# Patient Record
Sex: FEMALE | Race: WHITE | NOT HISPANIC OR LATINO | Employment: OTHER | URBAN - METROPOLITAN AREA
[De-identification: names, ages, dates, MRNs, and addresses within clinical notes are randomized per-mention and may not be internally consistent; named-entity substitution may affect disease eponyms.]

---

## 2023-03-20 LAB
ERYTHROCYTE DISTRIBUTION WIDTH (RATIO) BY AUTOMATED COUNT: 15.8 % (ref 11.5–14.5)
ERYTHROCYTE MEAN CORPUSCULAR HEMOGLOBIN CONCENTRATION (G/DL) BY AUTOMATED: 30.5 G/DL (ref 32–36)
ERYTHROCYTE MEAN CORPUSCULAR VOLUME (FL) BY AUTOMATED COUNT: 89 FL (ref 80–100)
ERYTHROCYTES (10*6/UL) IN BLOOD BY AUTOMATED COUNT: 4.67 X10E12/L (ref 4–5.2)
HEMATOCRIT (%) IN BLOOD BY AUTOMATED COUNT: 41.7 % (ref 36–46)
HEMOGLOBIN (G/DL) IN BLOOD: 12.7 G/DL (ref 12–16)
LEUKOCYTES (10*3/UL) IN BLOOD BY AUTOMATED COUNT: 11.7 X10E9/L (ref 4.4–11.3)
NRBC (PER 100 WBCS) BY AUTOMATED COUNT: 0 /100 WBC (ref 0–0)
PLATELETS (10*3/UL) IN BLOOD AUTOMATED COUNT: 204 X10E9/L (ref 150–450)

## 2023-03-21 DIAGNOSIS — F41.9 ANXIETY: Primary | ICD-10-CM

## 2023-03-21 LAB
ALANINE AMINOTRANSFERASE (SGPT) (U/L) IN SER/PLAS: 21 U/L (ref 7–45)
ALBUMIN (G/DL) IN SER/PLAS: 4.3 G/DL (ref 3.4–5)
ALKALINE PHOSPHATASE (U/L) IN SER/PLAS: 109 U/L (ref 33–136)
ANION GAP IN SER/PLAS: 14 MMOL/L (ref 10–20)
ASPARTATE AMINOTRANSFERASE (SGOT) (U/L) IN SER/PLAS: 28 U/L (ref 9–39)
BILIRUBIN TOTAL (MG/DL) IN SER/PLAS: 0.5 MG/DL (ref 0–1.2)
CALCIUM (MG/DL) IN SER/PLAS: 10.1 MG/DL (ref 8.6–10.6)
CARBON DIOXIDE, TOTAL (MMOL/L) IN SER/PLAS: 27 MMOL/L (ref 21–32)
CHLORIDE (MMOL/L) IN SER/PLAS: 106 MMOL/L (ref 98–107)
CREATININE (MG/DL) IN SER/PLAS: 1.19 MG/DL (ref 0.5–1.05)
GFR FEMALE: 45 ML/MIN/1.73M2
GLUCOSE (MG/DL) IN SER/PLAS: 80 MG/DL (ref 74–99)
POTASSIUM (MMOL/L) IN SER/PLAS: 4.6 MMOL/L (ref 3.5–5.3)
PROTEIN TOTAL: 6.5 G/DL (ref 6.4–8.2)
SODIUM (MMOL/L) IN SER/PLAS: 142 MMOL/L (ref 136–145)
UREA NITROGEN (MG/DL) IN SER/PLAS: 26 MG/DL (ref 6–23)

## 2023-03-21 RX ORDER — VERAPAMIL HYDROCHLORIDE 240 MG/1
CAPSULE, EXTENDED RELEASE ORAL
COMMUNITY
Start: 2020-10-27 | End: 2023-06-23 | Stop reason: SDUPTHER

## 2023-03-21 RX ORDER — SYRING-NEEDL,DISP,INSUL,0.3 ML 29 G X1/2"
300 SYRINGE, EMPTY DISPOSABLE MISCELLANEOUS
COMMUNITY
End: 2023-06-23 | Stop reason: WASHOUT

## 2023-03-21 RX ORDER — LORAZEPAM 1 MG/1
1 TABLET ORAL DAILY PRN
COMMUNITY
Start: 2016-05-27 | End: 2023-03-21 | Stop reason: SDUPTHER

## 2023-03-21 RX ORDER — POLYETHYLENE GLYCOL 3350 17 G/17G
17 POWDER, FOR SOLUTION ORAL
COMMUNITY
Start: 2020-10-27 | End: 2023-06-23 | Stop reason: ALTCHOICE

## 2023-03-21 RX ORDER — HYDROCODONE BITARTRATE AND ACETAMINOPHEN 5; 325 MG/1; MG/1
TABLET ORAL EVERY 6 HOURS
COMMUNITY
Start: 2021-06-14 | End: 2023-06-23 | Stop reason: ALTCHOICE

## 2023-03-21 RX ORDER — PANTOPRAZOLE SODIUM 40 MG/1
1 TABLET, DELAYED RELEASE ORAL DAILY
COMMUNITY
Start: 2019-05-24 | End: 2023-10-12 | Stop reason: SDUPTHER

## 2023-03-21 RX ORDER — LORAZEPAM 0.5 MG/1
1 TABLET ORAL EVERY 12 HOURS PRN
COMMUNITY
Start: 2013-03-05 | End: 2023-03-21 | Stop reason: SDUPTHER

## 2023-03-21 RX ORDER — CELECOXIB 200 MG/1
CAPSULE ORAL
COMMUNITY
End: 2023-06-23 | Stop reason: ALTCHOICE

## 2023-03-21 RX ORDER — BIOTIN 10 MG
1 TABLET ORAL DAILY
COMMUNITY
Start: 2019-05-17 | End: 2023-06-23 | Stop reason: WASHOUT

## 2023-03-21 RX ORDER — SENNOSIDES 8.6 MG/1
TABLET ORAL
COMMUNITY

## 2023-03-21 RX ORDER — LEVOTHYROXINE SODIUM 100 UG/1
TABLET ORAL
COMMUNITY
Start: 2020-10-20 | End: 2023-09-22 | Stop reason: SDUPTHER

## 2023-03-21 RX ORDER — METOPROLOL SUCCINATE 25 MG/1
1 TABLET, EXTENDED RELEASE ORAL DAILY
COMMUNITY
Start: 2019-09-27 | End: 2023-10-12 | Stop reason: SDUPTHER

## 2023-03-21 RX ORDER — DOCUSATE SODIUM 100 MG/1
CAPSULE, LIQUID FILLED ORAL 2 TIMES DAILY
COMMUNITY
Start: 2020-10-20 | End: 2023-06-23 | Stop reason: WASHOUT

## 2023-03-21 RX ORDER — FUROSEMIDE 20 MG/1
1 TABLET ORAL DAILY
COMMUNITY
Start: 2021-07-08 | End: 2023-10-12 | Stop reason: SDUPTHER

## 2023-03-21 RX ORDER — ROSUVASTATIN CALCIUM 10 MG/1
1 TABLET, COATED ORAL DAILY
COMMUNITY
Start: 2023-03-10 | End: 2023-06-23 | Stop reason: SDUPTHER

## 2023-03-21 RX ORDER — ASPIRIN 81 MG/1
TABLET ORAL
COMMUNITY
Start: 2023-03-10 | End: 2024-02-27 | Stop reason: SINTOL

## 2023-03-21 RX ORDER — TRAMADOL HYDROCHLORIDE 50 MG/1
TABLET ORAL EVERY 6 HOURS
COMMUNITY
Start: 2021-06-14 | End: 2023-06-23 | Stop reason: ALTCHOICE

## 2023-03-21 RX ORDER — FENOFIBRATE 160 MG/1
TABLET ORAL
COMMUNITY
End: 2023-06-23 | Stop reason: SDDI

## 2023-03-21 RX ORDER — ACETAMINOPHEN 500 MG
TABLET ORAL
COMMUNITY
Start: 2018-12-06

## 2023-03-22 RX ORDER — LORAZEPAM 0.5 MG/1
0.5 TABLET ORAL EVERY 12 HOURS PRN
Qty: 60 TABLET | Refills: 0 | Status: SHIPPED | OUTPATIENT
Start: 2023-03-22 | End: 2023-05-08 | Stop reason: SDUPTHER

## 2023-03-22 RX ORDER — LORAZEPAM 1 MG/1
1 TABLET ORAL DAILY PRN
Qty: 30 TABLET | Refills: 0 | Status: SHIPPED | OUTPATIENT
Start: 2023-03-22 | End: 2023-05-08 | Stop reason: SDUPTHER

## 2023-03-22 NOTE — TELEPHONE ENCOUNTER
Patient states this her third time calling for a refill of her lorazepam.   Patient also requesting  call back to discuss result of ct scan, has some follow up questions.      I sent in her meds.  Please find out what her questions are.

## 2023-04-04 DIAGNOSIS — E78.00 PURE HYPERCHOLESTEROLEMIA, UNSPECIFIED: ICD-10-CM

## 2023-05-05 RX ORDER — FENOFIBRATE 160 MG/1
TABLET ORAL
Qty: 90 TABLET | Refills: 1 | OUTPATIENT
Start: 2023-05-05

## 2023-05-08 ENCOUNTER — TELEPHONE (OUTPATIENT)
Dept: PRIMARY CARE | Facility: CLINIC | Age: 83
End: 2023-05-08
Payer: MEDICARE

## 2023-05-08 DIAGNOSIS — F41.9 ANXIETY: ICD-10-CM

## 2023-05-08 RX ORDER — LORAZEPAM 1 MG/1
1 TABLET ORAL DAILY PRN
Qty: 30 TABLET | Refills: 0 | Status: SHIPPED | OUTPATIENT
Start: 2023-05-08 | End: 2023-06-23 | Stop reason: SDUPTHER

## 2023-05-08 RX ORDER — LORAZEPAM 0.5 MG/1
0.5 TABLET ORAL EVERY 12 HOURS PRN
Qty: 60 TABLET | Refills: 0 | Status: SHIPPED | OUTPATIENT
Start: 2023-05-08 | End: 2023-06-23 | Stop reason: SDUPTHER

## 2023-05-08 NOTE — TELEPHONE ENCOUNTER
Patient called stating she needs refill of Lorazepam 0.5 mg and 1 mg called into [facility] on [address].  Last OV 2/20/2023, Next OV 5/26/2023.  Patient has been out for 10 days.        Meds sent, she is due for her 3 month follow up appt.

## 2023-05-11 ENCOUNTER — TELEPHONE (OUTPATIENT)
Dept: PRIMARY CARE | Facility: CLINIC | Age: 83
End: 2023-05-11
Payer: MEDICARE

## 2023-05-26 PROBLEM — E55.9 VITAMIN D DEFICIENCY: Status: ACTIVE | Noted: 2023-05-26

## 2023-05-26 PROBLEM — T84.84XA PAIN DUE TO TOTAL HIP REPLACEMENT (CMS-HCC): Status: ACTIVE | Noted: 2023-05-26

## 2023-05-26 PROBLEM — I10 HYPERTENSION: Status: ACTIVE | Noted: 2023-05-26

## 2023-05-26 PROBLEM — R00.2 PALPITATIONS: Status: ACTIVE | Noted: 2023-05-26

## 2023-05-26 PROBLEM — I50.30 HEART FAILURE WITH PRESERVED LEFT VENTRICULAR FUNCTION (HFPEF) (MULTI): Status: ACTIVE | Noted: 2023-05-26

## 2023-05-26 PROBLEM — R26.89 BALANCE DISORDER: Status: ACTIVE | Noted: 2023-05-26

## 2023-05-26 PROBLEM — D18.02: Status: ACTIVE | Noted: 2023-05-26

## 2023-05-26 PROBLEM — R51.9 CHRONIC HEADACHES: Status: ACTIVE | Noted: 2023-05-26

## 2023-05-26 PROBLEM — N18.31 CHRONIC KIDNEY DISEASE, STAGE 3A (MULTI): Status: ACTIVE | Noted: 2023-05-26

## 2023-05-26 PROBLEM — E03.9 HYPOTHYROIDISM: Status: ACTIVE | Noted: 2023-05-26

## 2023-05-26 PROBLEM — N83.8 OVARIAN MASS, RIGHT: Status: ACTIVE | Noted: 2023-05-26

## 2023-05-26 PROBLEM — J30.9 ALLERGIC RHINITIS: Status: ACTIVE | Noted: 2023-05-26

## 2023-05-26 PROBLEM — Z96.649 PAIN DUE TO TOTAL HIP REPLACEMENT (CMS-HCC): Status: ACTIVE | Noted: 2023-05-26

## 2023-05-26 PROBLEM — R26.9 GAIT DISTURBANCE: Status: ACTIVE | Noted: 2023-05-26

## 2023-05-26 PROBLEM — G45.3 AMAUROSIS FUGAX, LEFT EYE: Status: ACTIVE | Noted: 2023-05-26

## 2023-05-26 PROBLEM — M35.3: Status: ACTIVE | Noted: 2023-05-26

## 2023-05-26 PROBLEM — R79.89 ABNORMAL BLOOD CREATININE LEVEL: Status: ACTIVE | Noted: 2023-05-26

## 2023-05-26 PROBLEM — G89.29 CHRONIC HEADACHES: Status: ACTIVE | Noted: 2023-05-26

## 2023-05-26 PROBLEM — F41.9 ANXIETY DISORDER: Status: ACTIVE | Noted: 2023-05-26

## 2023-05-26 PROBLEM — M15.4 EROSIVE OSTEOARTHRITIS: Status: ACTIVE | Noted: 2023-05-26

## 2023-05-26 PROBLEM — M54.50 LOWER BACK PAIN: Status: ACTIVE | Noted: 2023-05-26

## 2023-05-26 PROBLEM — K21.9 GASTROESOPHAGEAL REFLUX DISEASE: Status: ACTIVE | Noted: 2023-05-26

## 2023-06-15 DIAGNOSIS — F41.9 ANXIETY: ICD-10-CM

## 2023-06-15 NOTE — TELEPHONE ENCOUNTER
PATIENT CALLED IN AND NEEDS MED REFILL    NEEDS  LORAZEPAM .5MG TWO A DAY  LORAZEPAM 1MG 1 TIME A DAY    [facility] [address] .[phone]    PATIENT HAS ZERO LEFT HAS BEEN OUT FOR 2 DAYS  LAST SEEN 2/20/23, NO FUTURE APPTS SCHEDULED.

## 2023-06-16 RX ORDER — LORAZEPAM 0.5 MG/1
0.5 TABLET ORAL EVERY 12 HOURS PRN
Qty: 60 TABLET | Refills: 0 | OUTPATIENT
Start: 2023-06-16 | End: 2023-07-16

## 2023-06-16 RX ORDER — LORAZEPAM 1 MG/1
1 TABLET ORAL DAILY PRN
Qty: 30 TABLET | Refills: 0 | OUTPATIENT
Start: 2023-06-16

## 2023-06-23 ENCOUNTER — OFFICE VISIT (OUTPATIENT)
Dept: PRIMARY CARE | Facility: CLINIC | Age: 83
End: 2023-06-23
Payer: MEDICARE

## 2023-06-23 ENCOUNTER — APPOINTMENT (OUTPATIENT)
Dept: LAB | Facility: LAB | Age: 83
End: 2023-06-23
Payer: MEDICARE

## 2023-06-23 VITALS
WEIGHT: 118 LBS | HEART RATE: 62 BPM | SYSTOLIC BLOOD PRESSURE: 135 MMHG | BODY MASS INDEX: 23.05 KG/M2 | DIASTOLIC BLOOD PRESSURE: 84 MMHG | OXYGEN SATURATION: 98 %

## 2023-06-23 DIAGNOSIS — M35.3: ICD-10-CM

## 2023-06-23 DIAGNOSIS — E78.00 HYPERCHOLESTEROLEMIA: ICD-10-CM

## 2023-06-23 DIAGNOSIS — I10 PRIMARY HYPERTENSION: ICD-10-CM

## 2023-06-23 DIAGNOSIS — F41.1 GENERALIZED ANXIETY DISORDER: Primary | ICD-10-CM

## 2023-06-23 DIAGNOSIS — F41.9 ANXIETY: ICD-10-CM

## 2023-06-23 DIAGNOSIS — Z79.899 MEDICATION MANAGEMENT: ICD-10-CM

## 2023-06-23 DIAGNOSIS — C76.8: ICD-10-CM

## 2023-06-23 DIAGNOSIS — I50.30 HEART FAILURE WITH PRESERVED LEFT VENTRICULAR FUNCTION (HFPEF) (MULTI): ICD-10-CM

## 2023-06-23 DIAGNOSIS — N18.31 CHRONIC KIDNEY DISEASE, STAGE 3A (MULTI): ICD-10-CM

## 2023-06-23 PROBLEM — E05.90 HYPERTHYROIDISM: Status: ACTIVE | Noted: 2023-06-23

## 2023-06-23 PROBLEM — R12 HEARTBURN: Status: ACTIVE | Noted: 2023-06-23

## 2023-06-23 PROBLEM — M48.061 SPINAL STENOSIS, LUMBAR: Status: ACTIVE | Noted: 2023-06-23

## 2023-06-23 PROBLEM — M16.11 PRIMARY OSTEOARTHRITIS OF RIGHT HIP: Status: ACTIVE | Noted: 2023-06-23

## 2023-06-23 PROBLEM — J45.909 REACTIVE AIRWAY DISEASE (HHS-HCC): Status: ACTIVE | Noted: 2023-06-23

## 2023-06-23 PROBLEM — N64.4 BREAST PAIN: Status: ACTIVE | Noted: 2023-06-23

## 2023-06-23 PROBLEM — R39.9 URINARY SYMPTOM OR SIGN: Status: ACTIVE | Noted: 2023-06-23

## 2023-06-23 PROBLEM — R60.0 EDEMA OF LOWER EXTREMITY: Status: ACTIVE | Noted: 2023-06-23

## 2023-06-23 PROBLEM — K59.00 CONSTIPATION: Status: ACTIVE | Noted: 2023-06-23

## 2023-06-23 PROBLEM — L81.9 DYSCHROMIA: Status: ACTIVE | Noted: 2023-06-23

## 2023-06-23 PROBLEM — G43.909 MIGRAINES: Status: ACTIVE | Noted: 2023-06-23

## 2023-06-23 PROBLEM — R11.0 NAUSEA: Status: ACTIVE | Noted: 2023-06-23

## 2023-06-23 PROBLEM — R14.2 BURPING: Status: ACTIVE | Noted: 2023-06-23

## 2023-06-23 PROBLEM — M25.50 POLYARTHRALGIA: Status: ACTIVE | Noted: 2023-06-23

## 2023-06-23 PROBLEM — Z96.1 PSEUDOPHAKIA OF RIGHT EYE: Status: ACTIVE | Noted: 2023-06-23

## 2023-06-23 PROBLEM — R07.9 CHEST PAIN: Status: ACTIVE | Noted: 2023-06-23

## 2023-06-23 PROBLEM — S16.1XXA CERVICAL STRAIN: Status: ACTIVE | Noted: 2023-06-23

## 2023-06-23 PROBLEM — L21.0 SEBORRHEA CAPITIS: Status: ACTIVE | Noted: 2023-06-23

## 2023-06-23 PROBLEM — G43.809 MIGRAINE VARIANT: Status: ACTIVE | Noted: 2023-06-23

## 2023-06-23 PROBLEM — N39.0 ACUTE UTI: Status: ACTIVE | Noted: 2023-06-23

## 2023-06-23 PROBLEM — K58.9 IRRITABLE BOWEL SYNDROME: Status: ACTIVE | Noted: 2023-06-23

## 2023-06-23 PROBLEM — R06.09 DOE (DYSPNEA ON EXERTION): Status: ACTIVE | Noted: 2023-06-23

## 2023-06-23 PROBLEM — R10.9 STOMACH PAIN: Status: ACTIVE | Noted: 2023-06-23

## 2023-06-23 PROBLEM — R51.9 PERSISTENT HEADACHES: Status: ACTIVE | Noted: 2023-06-23

## 2023-06-23 PROBLEM — M11.20 PSEUDOGOUT: Status: ACTIVE | Noted: 2023-06-23

## 2023-06-23 PROBLEM — J20.9 ACUTE BRONCHITIS: Status: ACTIVE | Noted: 2023-06-23

## 2023-06-23 PROBLEM — J04.0 LARYNGITIS: Status: ACTIVE | Noted: 2023-06-23

## 2023-06-23 PROBLEM — R53.81 MALAISE: Status: ACTIVE | Noted: 2023-06-23

## 2023-06-23 PROBLEM — M25.511 PAIN IN JOINT OF RIGHT SHOULDER: Status: ACTIVE | Noted: 2023-06-23

## 2023-06-23 PROBLEM — R53.83 FATIGUE: Status: ACTIVE | Noted: 2023-06-23

## 2023-06-23 PROBLEM — R00.0 TACHYCARDIA: Status: ACTIVE | Noted: 2023-06-23

## 2023-06-23 PROBLEM — H53.8 BLURRY VISION: Status: ACTIVE | Noted: 2023-06-23

## 2023-06-23 PROBLEM — M54.9 PAIN, UPPER BACK: Status: ACTIVE | Noted: 2023-06-23

## 2023-06-23 PROBLEM — S29.019A STRAIN OF THORACIC REGION: Status: ACTIVE | Noted: 2023-06-23

## 2023-06-23 PROBLEM — H54.7 VISUAL ACUITY REDUCED: Status: ACTIVE | Noted: 2023-06-23

## 2023-06-23 PROBLEM — M11.80 OTHER SPECIFIED CRYSTAL ARTHROPATHIES, UNSPECIFIED SITE: Status: ACTIVE | Noted: 2023-06-23

## 2023-06-23 PROBLEM — H53.9 VISUAL CHANGES: Status: ACTIVE | Noted: 2023-06-23

## 2023-06-23 PROBLEM — M12.9 ARTHROPATHY: Status: ACTIVE | Noted: 2023-06-23

## 2023-06-23 PROBLEM — H52.4 PRESBYOPIA: Status: ACTIVE | Noted: 2023-06-23

## 2023-06-23 PROBLEM — H52.203 ASTIGMATISM, BILATERAL: Status: ACTIVE | Noted: 2023-06-23

## 2023-06-23 PROBLEM — R30.0 BURNING WITH URINATION: Status: ACTIVE | Noted: 2023-06-23

## 2023-06-23 PROBLEM — R05.9 COUGH: Status: ACTIVE | Noted: 2023-06-23

## 2023-06-23 PROBLEM — N28.9 RENAL INSUFFICIENCY: Status: ACTIVE | Noted: 2023-06-23

## 2023-06-23 PROBLEM — L30.1 DYSHIDROTIC ECZEMA: Status: ACTIVE | Noted: 2023-06-23

## 2023-06-23 PROBLEM — R42 VERTIGO: Status: ACTIVE | Noted: 2023-06-23

## 2023-06-23 PROBLEM — M25.559 PAIN, HIP: Status: ACTIVE | Noted: 2023-06-23

## 2023-06-23 PROBLEM — Z96.1 PSEUDOPHAKIA OF LEFT EYE: Status: ACTIVE | Noted: 2023-06-23

## 2023-06-23 PROBLEM — R09.81 SINUS CONGESTION: Status: ACTIVE | Noted: 2023-06-23

## 2023-06-23 PROBLEM — Z86.69 H/O AMAUROSIS FUGAX: Status: ACTIVE | Noted: 2023-06-23

## 2023-06-23 PROBLEM — M17.12 ARTHRITIS OF LEFT KNEE: Status: ACTIVE | Noted: 2023-06-23

## 2023-06-23 PROBLEM — R32 URINARY INCONTINENCE: Status: ACTIVE | Noted: 2023-06-23

## 2023-06-23 PROBLEM — B35.4 TINEA CORPORIS: Status: ACTIVE | Noted: 2023-06-23

## 2023-06-23 PROBLEM — R19.00 PELVIC MASS: Status: ACTIVE | Noted: 2023-06-23

## 2023-06-23 PROBLEM — N95.0 POSTMENOPAUSAL BLEEDING: Status: ACTIVE | Noted: 2023-06-23

## 2023-06-23 PROBLEM — L30.9 ECZEMA: Status: ACTIVE | Noted: 2023-06-23

## 2023-06-23 PROBLEM — M25.569 JOINT PAIN, KNEE: Status: ACTIVE | Noted: 2023-06-23

## 2023-06-23 PROBLEM — M25.512 PAIN IN JOINT OF LEFT SHOULDER: Status: ACTIVE | Noted: 2023-06-23

## 2023-06-23 PROBLEM — R06.02 SHORTNESS OF BREATH: Status: ACTIVE | Noted: 2023-06-23

## 2023-06-23 PROBLEM — L98.9 SKIN LESIONS: Status: ACTIVE | Noted: 2023-06-23

## 2023-06-23 PROBLEM — K43.9 VENTRAL HERNIA: Status: ACTIVE | Noted: 2023-06-23

## 2023-06-23 PROBLEM — M17.9 OSTEOARTHRITIS OF KNEE: Status: ACTIVE | Noted: 2023-06-23

## 2023-06-23 PROBLEM — R06.2 WHEEZING: Status: ACTIVE | Noted: 2023-06-23

## 2023-06-23 PROBLEM — J32.9 SINUSITIS: Status: ACTIVE | Noted: 2023-06-23

## 2023-06-23 PROCEDURE — 80373 DRUG SCREENING TRAMADOL: CPT

## 2023-06-23 PROCEDURE — 80307 DRUG TEST PRSMV CHEM ANLYZR: CPT

## 2023-06-23 PROCEDURE — 3079F DIAST BP 80-89 MM HG: CPT | Performed by: FAMILY MEDICINE

## 2023-06-23 PROCEDURE — 80361 OPIATES 1 OR MORE: CPT

## 2023-06-23 PROCEDURE — 1159F MED LIST DOCD IN RCRD: CPT | Performed by: FAMILY MEDICINE

## 2023-06-23 PROCEDURE — 1036F TOBACCO NON-USER: CPT | Performed by: FAMILY MEDICINE

## 2023-06-23 PROCEDURE — 80365 DRUG SCREENING OXYCODONE: CPT

## 2023-06-23 PROCEDURE — 3075F SYST BP GE 130 - 139MM HG: CPT | Performed by: FAMILY MEDICINE

## 2023-06-23 PROCEDURE — 80368 SEDATIVE HYPNOTICS: CPT

## 2023-06-23 PROCEDURE — 80354 DRUG SCREENING FENTANYL: CPT

## 2023-06-23 PROCEDURE — 80358 DRUG SCREENING METHADONE: CPT

## 2023-06-23 PROCEDURE — 99214 OFFICE O/P EST MOD 30 MIN: CPT | Performed by: FAMILY MEDICINE

## 2023-06-23 PROCEDURE — 80346 BENZODIAZEPINES1-12: CPT

## 2023-06-23 RX ORDER — LORAZEPAM 1 MG/1
1 TABLET ORAL DAILY PRN
Qty: 30 TABLET | Refills: 2 | Status: SHIPPED | OUTPATIENT
Start: 2023-06-23 | End: 2023-10-12 | Stop reason: SDUPTHER

## 2023-06-23 RX ORDER — VERAPAMIL HYDROCHLORIDE 120 MG/1
120 CAPSULE, EXTENDED RELEASE ORAL DAILY
Qty: 30 CAPSULE | Refills: 2 | Status: SHIPPED | OUTPATIENT
Start: 2023-06-23 | End: 2023-10-12 | Stop reason: SDUPTHER

## 2023-06-23 RX ORDER — ROSUVASTATIN CALCIUM 10 MG/1
10 TABLET, COATED ORAL DAILY
Qty: 90 TABLET | Refills: 0 | Status: SHIPPED | OUTPATIENT
Start: 2023-06-23 | End: 2023-10-12 | Stop reason: SDUPTHER

## 2023-06-23 RX ORDER — LORAZEPAM 0.5 MG/1
0.5 TABLET ORAL EVERY 12 HOURS PRN
Qty: 60 TABLET | Refills: 2 | Status: SHIPPED | OUTPATIENT
Start: 2023-06-23 | End: 2023-10-12 | Stop reason: SDUPTHER

## 2023-06-23 RX ORDER — COLCHICINE 0.6 MG/1
0.6 TABLET ORAL DAILY
COMMUNITY
End: 2023-10-12 | Stop reason: SDUPTHER

## 2023-06-23 NOTE — PROGRESS NOTES
Subjective   Patient ID: Lisa Hassan is a 83 y.o. female who presents for FUV (MEDICATION FUV).    HPI   She states that she has been having a lot of leg pain over the last 2 weeks, R leg, lateral and inferior to knee. Denies injury.  She has been applying heat and using Voltaren gel without relief. She did have a venous doppler done but it did not show any blood clots in the past and she states she was told the pain was due to OA of her knee by ortho.     She saw cardiology in March. She is not sure if she needs follow up. She cannot take Aspirin which is in note from cardiologist and she is to be on crestor and she did not start this. She did wear a heart monitor and had a ECHO done. She is due to see cardiology in September.     She is taking her thyroid med first thing in the morning and then taking all her other meds together later in the morning    Pt needs her alprazolam refilled,  she uses it for anxiety and states over the years nothing else has helped her.  She denies sleepiness from it or other side effects.        Review of Systems    Objective   /84   Pulse 62   Wt 53.5 kg (118 lb)   SpO2 98%   BMI 23.05 kg/m²     Physical Exam  Constitutional:       Appearance: Normal appearance.   Cardiovascular:      Rate and Rhythm: Normal rate and regular rhythm.      Pulses: Normal pulses.      Heart sounds: Normal heart sounds.   Pulmonary:      Effort: Pulmonary effort is normal.      Breath sounds: Normal breath sounds.   Musculoskeletal:         General: Tenderness present.      Cervical back: Normal range of motion.      Right lower leg: No edema.      Left lower leg: No edema.      Comments: Pt has tenderness lateral R leg inferior to knee, has some palpable spasm.  ROM close to normal while sitting.  Weight bearing causes pain, pt using cane for ambulation.  No swelling or erythema noted   Skin:     General: Skin is warm and dry.   Neurological:      Mental Status: She is alert and oriented to  person, place, and time.   Psychiatric:         Mood and Affect: Mood normal.         Thought Content: Thought content normal.         Judgment: Judgment normal.         Assessment/Plan   Problem List Items Addressed This Visit    Reviewed blood work from March 2023.   Take pantoprazole first thing in the morning by itself.   Make an appointment with cardiology for September.  Get blood work done in September and will review at her 3 month follow up OV.     1. Hypercholesterolemia  - rosuvastatin (Crestor) 10 mg tablet; Take 1 tablet (10 mg) by mouth once daily.  Dispense: 90 tablet; Refill: 0    Refilled ativan,at same dose.  Cautioned about drowsiness, fall risks etc.  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[facility])    Anxiety disorder - Primary    Chronic kidney disease, stage 3a    Heart failure with preserved left ventricular function (HFpEF) (CMS/HCC)    Relevant Medications    verapamil ER (Veralan PM) 120 mg 24 hr capsule    Hypertension    Relevant Medications    verapamil ER (Veralan PM) 120 mg 24 hr capsule    Hypercholesterolemia    Relevant Medications    rosuvastatin (Crestor) 10 mg tablet     Other Visit Diagnoses       Anxiety        Relevant Medications    LORazepam (Ativan) 0.5 mg tablet    LORazepam (Ativan) 1 mg tablet    Malignant neoplasm of other specified ill-defined sites (CMS/[facility])        Medication management        Relevant Orders    Drug Screen, Urine With Reflex to Confirmation    Opiate/Opioid/Benzo Extended Prescription Compliance          Scribe Attestation  By signing my name below, I, Kathia Weeks , Scribe   attest that this documentation has been prepared under the direction and in the presence of Corinne Richards DO.

## 2023-06-23 NOTE — PATIENT INSTRUCTIONS
Reviewed blood work from March 2023.   Take pantoprazole first thing in the morning by itself.   Make an appointment with cardiology for September.  Get blood work done in September.     1. Hypercholesterolemia  - rosuvastatin (Crestor) 10 mg tablet; Take 1 tablet (10 mg) by mouth once daily.  Dispense: 90 tablet; Refill: 0

## 2023-06-24 LAB
6-ACETYLMORPHINE: NORMAL
7-AMINOCLONAZEPAM: NORMAL
ALPHA-HYDROXYALPRAZOLAM: NORMAL
ALPHA-HYDROXYMIDAZOLAM: NORMAL
ALPRAZOLAM: NORMAL
AMPHETAMINE (PRESENCE) IN URINE BY SCREEN METHOD: NORMAL
BARBITURATES PRESENCE IN URINE BY SCREEN METHOD: NORMAL
BENZODIAZEPINE (PRESENCE) IN URINE BY SCREEN METHOD: NORMAL
BENZODIAZEPINE (PRESENCE) IN URINE BY SCREEN METHOD: NORMAL
CANNABINOIDS IN URINE BY SCREEN METHOD: NORMAL
CHLORDIAZEPOXIDE: NORMAL
CLONAZEPAM: NORMAL
COCAINE (PRESENCE) IN URINE BY SCREEN METHOD: NORMAL
CODEINE: NORMAL
CREATINE, URINE FOR DRUG: NORMAL MG/DL
DIAZEPAM: NORMAL
DRUG SCREEN COMMENT URINE: NORMAL
EDDP: NORMAL
FENTANYL CONFIRMATION, URINE: NORMAL
FENTANYL URINE: NORMAL
FENTANYL URINE: NORMAL
HYDROCODONE: NORMAL
HYDROMORPHONE: NORMAL
LORAZEPAM: NORMAL
METHADONE (PRESENCE) IN URINE BY SCREEN METHOD: NORMAL
METHADONE (PRESENCE) IN URINE BY SCREEN METHOD: NORMAL
METHADONE CONFIRMATION,URINE: NORMAL
MIDAZOLAM: NORMAL
MORPHINE URINE: NORMAL
N-DESMETHYLTRAMADOL-DATA CONVERSION: NORMAL
NORDIAZEPAM: NORMAL
NORFENTANYL: NORMAL
NORHYDROCODONE: NORMAL
NOROXYCODONE: NORMAL
NOROXYMORPHONE URINE: NORMAL
O-DESMETHYLTRAMADOL: NORMAL
OPIATES (PRESENCE) IN URINE BY SCREEN METHOD: NORMAL
OPIATES (PRESENCE) IN URINE BY SCREEN METHOD: NORMAL
OXAZEPAM: NORMAL
OXYCODONE (PRESENCE) IN URINE BY SCREEN METHOD: NORMAL
OXYCODONE (PRESENCE) IN URINE BY SCREEN METHOD: NORMAL
OXYCODONE: NORMAL
OXYMORPHONE: NORMAL
PHENCYCLIDINE (PRESENCE) IN URINE BY SCREEN METHOD: NORMAL
TEMAZEPAM: NORMAL
TRAMADOL: NORMAL
ZOLPIDEM METABOLITE (ZCA): NORMAL
ZOLPIDEM: NORMAL

## 2023-06-28 LAB
6-ACETYLMORPHINE: <25 NG/ML
7-AMINOCLONAZEPAM: <25 NG/ML
ALPHA-HYDROXYALPRAZOLAM: <25 NG/ML
ALPHA-HYDROXYMIDAZOLAM: <25 NG/ML
ALPRAZOLAM: <25 NG/ML
AMPHETAMINE (PRESENCE) IN URINE BY SCREEN METHOD: ABNORMAL
BARBITURATES PRESENCE IN URINE BY SCREEN METHOD: ABNORMAL
CANNABINOIDS IN URINE BY SCREEN METHOD: ABNORMAL
CHLORDIAZEPOXIDE: <25 NG/ML
CLONAZEPAM: <25 NG/ML
COCAINE (PRESENCE) IN URINE BY SCREEN METHOD: ABNORMAL
CODEINE: <50 NG/ML
CREATINE, URINE FOR DRUG: 133.9 MG/DL
DIAZEPAM: <25 NG/ML
DRUG SCREEN COMMENT URINE: ABNORMAL
EDDP: <25 NG/ML
FENTANYL CONFIRMATION, URINE: <2.5 NG/ML
HYDROCODONE: <25 NG/ML
HYDROMORPHONE: <25 NG/ML
LORAZEPAM: 755 NG/ML
METHADONE CONFIRMATION,URINE: <25 NG/ML
MIDAZOLAM: <25 NG/ML
MORPHINE URINE: <50 NG/ML
NORDIAZEPAM: <25 NG/ML
NORFENTANYL: <2.5 NG/ML
NORHYDROCODONE: <25 NG/ML
NOROXYCODONE: <25 NG/ML
O-DESMETHYLTRAMADOL: <50 NG/ML
OXAZEPAM: <25 NG/ML
OXYCODONE: <25 NG/ML
OXYMORPHONE: <25 NG/ML
PHENCYCLIDINE (PRESENCE) IN URINE BY SCREEN METHOD: ABNORMAL
TEMAZEPAM: <25 NG/ML
TRAMADOL: <50 NG/ML
ZOLPIDEM METABOLITE (ZCA): <25 NG/ML
ZOLPIDEM: <25 NG/ML

## 2023-09-21 DIAGNOSIS — E03.9 HYPOTHYROIDISM, UNSPECIFIED TYPE: Primary | ICD-10-CM

## 2023-09-21 NOTE — TELEPHONE ENCOUNTER
Refill levothyroxine  100    Pharmacy: [facility]     Appt   Last 6/23/23,NO FUTURE APPTS.

## 2023-09-22 RX ORDER — LEVOTHYROXINE SODIUM 100 UG/1
100 TABLET ORAL
Qty: 90 TABLET | Refills: 0 | Status: SHIPPED | OUTPATIENT
Start: 2023-09-22 | End: 2023-09-28 | Stop reason: ENTERED-IN-ERROR

## 2023-09-28 DIAGNOSIS — E03.9 HYPOTHYROIDISM, UNSPECIFIED TYPE: Primary | ICD-10-CM

## 2023-09-28 DIAGNOSIS — F41.9 ANXIETY: ICD-10-CM

## 2023-09-28 PROBLEM — E78.2 MIXED HYPERLIPIDEMIA: Status: ACTIVE | Noted: 2023-09-28

## 2023-09-28 PROBLEM — R07.9 CHEST PAIN, UNSPECIFIED: Status: ACTIVE | Noted: 2023-09-28

## 2023-09-28 RX ORDER — LEVOTHYROXINE SODIUM 75 UG/1
75 TABLET ORAL DAILY
Qty: 90 TABLET | Refills: 0 | OUTPATIENT
Start: 2023-09-28

## 2023-09-28 RX ORDER — FENOFIBRATE 145 MG/1
1 TABLET, FILM COATED ORAL DAILY
COMMUNITY
End: 2023-10-12 | Stop reason: SDUPTHER

## 2023-09-28 RX ORDER — LORAZEPAM 0.5 MG/1
0.5 TABLET ORAL EVERY 12 HOURS PRN
Qty: 60 TABLET | Refills: 2 | OUTPATIENT
Start: 2023-09-28 | End: 2023-12-27

## 2023-09-28 RX ORDER — LEVOTHYROXINE SODIUM 75 UG/1
75 TABLET ORAL DAILY
Qty: 90 TABLET | Refills: 0 | Status: SHIPPED | OUTPATIENT
Start: 2023-09-28 | End: 2023-10-12 | Stop reason: SDUPTHER

## 2023-09-28 RX ORDER — LEVOTHYROXINE SODIUM 75 UG/1
1 TABLET ORAL DAILY
COMMUNITY
End: 2023-09-28 | Stop reason: SDUPTHER

## 2023-09-28 RX ORDER — LORAZEPAM 1 MG/1
1 TABLET ORAL DAILY PRN
Qty: 30 TABLET | Refills: 2 | OUTPATIENT
Start: 2023-09-28 | End: 2023-12-27

## 2023-09-28 NOTE — TELEPHONE ENCOUNTER
Patient picked up script for levo 100. Patient states she takes levo 75 mcg and always have. Patient also request refills on both ativan.

## 2023-10-04 ENCOUNTER — HOSPITAL ENCOUNTER (EMERGENCY)
Facility: HOSPITAL | Age: 83
Discharge: LEFT BEFORE DISCHARGE | End: 2023-10-04
Attending: EMERGENCY MEDICINE
Payer: MEDICARE

## 2023-10-04 ENCOUNTER — APPOINTMENT (OUTPATIENT)
Dept: RADIOLOGY | Facility: HOSPITAL | Age: 83
End: 2023-10-04
Payer: MEDICARE

## 2023-10-04 VITALS
WEIGHT: 110 LBS | TEMPERATURE: 98.6 F | OXYGEN SATURATION: 98 % | SYSTOLIC BLOOD PRESSURE: 123 MMHG | HEART RATE: 64 BPM | RESPIRATION RATE: 13 BRPM | BODY MASS INDEX: 21.6 KG/M2 | HEIGHT: 60 IN | DIASTOLIC BLOOD PRESSURE: 66 MMHG
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
## 2023-10-04 DIAGNOSIS — S09.90XA CLOSED HEAD INJURY, INITIAL ENCOUNTER: ICD-10-CM

## 2023-10-04 LAB
ALBUMIN SERPL BCP-MCNC: 4.4 G/DL (ref 3.4–5)
ALP SERPL-CCNC: 129 U/L (ref 33–136)
ALT SERPL W P-5'-P-CCNC: 31 U/L (ref 7–45)
ANION GAP BLDV CALCULATED.4IONS-SCNC: 9 MMOL/L (ref 10–25)
ANION GAP SERPL CALC-SCNC: 14 MMOL/L (ref 10–20)
APTT PPP: 32 SECONDS (ref 27–38)
AST SERPL W P-5'-P-CCNC: 45 U/L (ref 9–39)
BASE EXCESS BLDV CALC-SCNC: 1.4 MMOL/L (ref -2–3)
BASOPHILS # BLD AUTO: 0.04 X10*3/UL (ref 0–0.1)
BASOPHILS NFR BLD AUTO: 0.3 %
BILIRUB SERPL-MCNC: 0.6 MG/DL (ref 0–1.2)
BNP SERPL-MCNC: 151 PG/ML (ref 0–99)
BODY TEMPERATURE: 37 DEGREES CELSIUS
BUN SERPL-MCNC: 29 MG/DL (ref 6–23)
CA-I BLDV-SCNC: 1.23 MMOL/L (ref 1.1–1.33)
CALCIUM SERPL-MCNC: 9.6 MG/DL (ref 8.6–10.3)
CARDIAC TROPONIN I PNL SERPL HS: 11 NG/L (ref 0–13)
CHLORIDE BLDV-SCNC: 104 MMOL/L (ref 98–107)
CHLORIDE SERPL-SCNC: 103 MMOL/L (ref 98–107)
CK SERPL-CCNC: 65 U/L (ref 0–215)
CO2 SERPL-SCNC: 26 MMOL/L (ref 21–32)
CREAT SERPL-MCNC: 1.06 MG/DL (ref 0.5–1.05)
EOSINOPHIL # BLD AUTO: 0.12 X10*3/UL (ref 0–0.4)
EOSINOPHIL NFR BLD AUTO: 0.9 %
ERYTHROCYTE [DISTWIDTH] IN BLOOD BY AUTOMATED COUNT: 15.2 % (ref 11.5–14.5)
GFR SERPL CREATININE-BSD FRML MDRD: 52 ML/MIN/1.73M*2
GLUCOSE BLDV-MCNC: 91 MG/DL (ref 74–99)
GLUCOSE SERPL-MCNC: 84 MG/DL (ref 74–99)
HCO3 BLDV-SCNC: 26.6 MMOL/L (ref 22–26)
HCT VFR BLD AUTO: 44.6 % (ref 36–46)
HCT VFR BLD EST: 43 % (ref 36–46)
HGB BLD-MCNC: 14.6 G/DL (ref 12–16)
HGB BLDV-MCNC: 14.3 G/DL (ref 12–16)
IMM GRANULOCYTES # BLD AUTO: 0.08 X10*3/UL (ref 0–0.5)
IMM GRANULOCYTES NFR BLD AUTO: 0.6 % (ref 0–0.9)
INHALED O2 CONCENTRATION: 21 %
INR PPP: 1.1 (ref 0.9–1.1)
LACTATE BLDV-SCNC: 1.4 MMOL/L (ref 0.4–2)
LACTATE SERPL-SCNC: 0.9 MMOL/L (ref 0.4–2)
LYMPHOCYTES # BLD AUTO: 3.28 X10*3/UL (ref 0.8–3)
LYMPHOCYTES NFR BLD AUTO: 24.2 %
MCH RBC QN AUTO: 29.6 PG (ref 26–34)
MCHC RBC AUTO-ENTMCNC: 32.7 G/DL (ref 32–36)
MCV RBC AUTO: 90 FL (ref 80–100)
MONOCYTES # BLD AUTO: 0.44 X10*3/UL (ref 0.05–0.8)
MONOCYTES NFR BLD AUTO: 3.2 %
NEUTROPHILS # BLD AUTO: 9.6 X10*3/UL (ref 1.6–5.5)
NEUTROPHILS NFR BLD AUTO: 70.8 %
NRBC BLD-RTO: 0 /100 WBCS (ref 0–0)
OXYHGB MFR BLDV: 43.9 % (ref 45–75)
PCO2 BLDV: 43 MM HG (ref 41–51)
PH BLDV: 7.4 PH (ref 7.33–7.43)
PLATELET # BLD AUTO: 201 X10*3/UL (ref 150–450)
PMV BLD AUTO: 11.3 FL (ref 7.5–11.5)
PO2 BLDV: 28 MM HG (ref 35–45)
POTASSIUM BLDV-SCNC: 3.6 MMOL/L (ref 3.5–5.3)
POTASSIUM SERPL-SCNC: 3.9 MMOL/L (ref 3.5–5.3)
PROT SERPL-MCNC: 6.9 G/DL (ref 6.4–8.2)
PROTHROMBIN TIME: 12.5 SECONDS (ref 9.8–12.8)
RBC # BLD AUTO: 4.94 X10*6/UL (ref 4–5.2)
SAO2 % BLDV: 44 % (ref 45–75)
SODIUM BLDV-SCNC: 136 MMOL/L (ref 136–145)
SODIUM SERPL-SCNC: 139 MMOL/L (ref 136–145)
WBC # BLD AUTO: 13.6 X10*3/UL (ref 4.4–11.3)

## 2023-10-04 PROCEDURE — 70450 CT HEAD/BRAIN W/O DYE: CPT | Performed by: SURGERY

## 2023-10-04 PROCEDURE — 72125 CT NECK SPINE W/O DYE: CPT | Performed by: SURGERY

## 2023-10-04 PROCEDURE — 84132 ASSAY OF SERUM POTASSIUM: CPT

## 2023-10-04 PROCEDURE — 82330 ASSAY OF CALCIUM: CPT

## 2023-10-04 PROCEDURE — 85730 THROMBOPLASTIN TIME PARTIAL: CPT

## 2023-10-04 PROCEDURE — 72131 CT LUMBAR SPINE W/O DYE: CPT | Mod: MG

## 2023-10-04 PROCEDURE — 85025 COMPLETE CBC W/AUTO DIFF WBC: CPT

## 2023-10-04 PROCEDURE — 74176 CT ABD & PELVIS W/O CONTRAST: CPT | Performed by: RADIOLOGY

## 2023-10-04 PROCEDURE — 70450 CT HEAD/BRAIN W/O DYE: CPT

## 2023-10-04 PROCEDURE — 36415 COLL VENOUS BLD VENIPUNCTURE: CPT

## 2023-10-04 PROCEDURE — 82550 ASSAY OF CK (CPK): CPT

## 2023-10-04 PROCEDURE — 83605 ASSAY OF LACTIC ACID: CPT

## 2023-10-04 PROCEDURE — 85610 PROTHROMBIN TIME: CPT

## 2023-10-04 PROCEDURE — G1004 CDSM NDSC: HCPCS

## 2023-10-04 PROCEDURE — 71250 CT THORAX DX C-: CPT | Performed by: RADIOLOGY

## 2023-10-04 PROCEDURE — 72128 CT CHEST SPINE W/O DYE: CPT | Performed by: RADIOLOGY

## 2023-10-04 PROCEDURE — 74176 CT ABD & PELVIS W/O CONTRAST: CPT | Mod: MG

## 2023-10-04 PROCEDURE — 72131 CT LUMBAR SPINE W/O DYE: CPT | Performed by: RADIOLOGY

## 2023-10-04 PROCEDURE — 99285 EMERGENCY DEPT VISIT HI MDM: CPT | Mod: 25 | Performed by: EMERGENCY MEDICINE

## 2023-10-04 PROCEDURE — 83880 ASSAY OF NATRIURETIC PEPTIDE: CPT

## 2023-10-04 PROCEDURE — 84484 ASSAY OF TROPONIN QUANT: CPT

## 2023-10-04 ASSESSMENT — PAIN SCALES - GENERAL: PAINLEVEL_OUTOF10: 0 - NO PAIN

## 2023-10-04 ASSESSMENT — PAIN DESCRIPTION - PROGRESSION: CLINICAL_PROGRESSION: NOT CHANGED

## 2023-10-04 ASSESSMENT — COLUMBIA-SUICIDE SEVERITY RATING SCALE - C-SSRS
2. HAVE YOU ACTUALLY HAD ANY THOUGHTS OF KILLING YOURSELF?: NO
6. HAVE YOU EVER DONE ANYTHING, STARTED TO DO ANYTHING, OR PREPARED TO DO ANYTHING TO END YOUR LIFE?: NO
1. IN THE PAST MONTH, HAVE YOU WISHED YOU WERE DEAD OR WISHED YOU COULD GO TO SLEEP AND NOT WAKE UP?: NO

## 2023-10-04 ASSESSMENT — PAIN - FUNCTIONAL ASSESSMENT: PAIN_FUNCTIONAL_ASSESSMENT: 0-10

## 2023-10-04 NOTE — ED PROVIDER NOTES
HPI   Chief Complaint   Patient presents with   • Fall     Fall, no pain or injury. On ground 1.5 hours       HISTORY OF PRESENT ILLNESS:  83 y.o.  year old female  presenting to the ED with complaint of a fall that occurred about 2 hours prior to arrival.  Patient states that she was at home in ambulating with her walker, and fell.  She does not know how she fell, but she knows that she fell backwards, and her walker landed on top of her.  She does not member the events of the fall.  She does not think she lost consciousness, but she is not sure as she does not member what happened.  She states that the next thing she knew she was on the ground with her walker on top of her.  She states that her head was bobbing back-and-forth abnormally.  She did hit the back of her head on the ground.  Patient states that she was on the floor for at least 90 minutes as she was initially unable to get up and was unable to reach her phone.  She reports she had to crawl and it took a long time to get to her phone to call her daughter who called EMS.  She currently denies any complaints, has no pain in the head, neck, back, extremities, chest, or abdomen.  She states that she feels well.  She denies any use of anticoagulant or antiplatelet agents.  She does endorse having palpitations about 2 nights ago, but has not had this today.     PMH: HTN, HLD, CHF, hypothyroid, CKD, GERD  Family history: noncontributory  Social history: non smoker, no ETOH, no illicit substances    REVIEW OF SYSTEMS:    General: Denies fever, chills, malaise, confusion, decreased appetite or fluids  Respiratory:  Denies cough, shortness of breath  Cardiac:  Denies chest pain, palpitations  Gastrointestinal:  Denies abdominal pain, nausea, vomiting, diarrhea, constipation  Musculoskeletal:  Denies joint pain, neck pain, back pain, decreased ROM, swelling, weakness  Neurological:  Denies headache, numbness, tingling  Eyes:  Denies blurry vision, vision loss or  changes  ENMT:  Denies nosebleed, sore throat  Skin: Denies rash or wounds. Denies bruising or bleeding      Labs Reviewed  CBC WITH AUTO DIFFERENTIAL - Abnormal     WBC                           13.6 (*)               nRBC                          0.0                    RBC                           4.94                   Hemoglobin                    14.6                   Hematocrit                    44.6                   MCV                           90                     MCH                           29.6                   MCHC                          32.7                   RDW                           15.2 (*)               Platelets                     201                    MPV                           11.3                   Neutrophils %                 70.8                   Immature Granulocytes %, Automated   0.6                    Lymphocytes %                 24.2                   Monocytes %                   3.2                    Eosinophils %                 0.9                    Basophils %                   0.3                    Neutrophils Absolute          9.60 (*)               Immature Granulocytes Absolute, Au*   0.08                   Lymphocytes Absolute          3.28 (*)               Monocytes Absolute            0.44                   Eosinophils Absolute          0.12                   Basophils Absolute            0.04                BLOOD GAS VENOUS FULL PANEL - Abnormal     POCT pH, Venous               7.40                   POCT pCO2, Venous             43                     POCT pO2, Venous              28 (*)                 POCT SO2, Venous              44 (*)                 POCT Oxy Hemoglobin, Venous   43.9 (*)               POCT Hematocrit Calculated, Venous   43.0                   POCT Sodium, Venous           136                    POCT Potassium, Venous        3.6                    POCT Chloride, Venous         104                    POCT Ionized [place],  Venous   1.23                   POCT Glucose, Venous          91                     POCT Lactate, Venous          1.4                    POCT Base Excess, Venous      1.4                    POCT HCO3 Calculated, Venous   26.6 (*)               POCT Hemoglobin, Venous       14.3                   POCT Anion Gap, Venous        9.0 (*)                Patient Temperature           37.0                   FiO2                          21                  COMPREHENSIVE METABOLIC PANEL  PROTIME-INR  APTT  CREATINE KINASE  LACTATE  TROPONIN I, HIGH SENSITIVITY  B-TYPE NATRIURETIC PEPTIDE    CT head wo IV contrast   Final Result    No evidence of acute intracranial abnormality.          No acute cervical spine fracture or malalignment.                MACRO:    None          Signed by: Alireza Haque 10/4/2023 5:09 AM    Dictation workstation:   [id number]     CT cervical spine wo IV contrast   Final Result    No evidence of acute intracranial abnormality.          No acute cervical spine fracture or malalignment.                MACRO:    None          Signed by: Alireza Haque 10/4/2023 5:09 AM    Dictation workstation:   [id number]                                No data recorded                Patient History   Past Medical History:   Diagnosis Date   • Abdominal distension (gaseous) 04/14/2016    Bloating   • Abrasion of unspecified forearm, initial encounter 06/27/2019    Forearm abrasion   • CHF (congestive heart failure) (CMS/[facility])    • Dizziness and giddiness     Lightheadedness   • Headache, unspecified 04/10/2017    Persistent headaches   • Headache, unspecified 07/02/2018    Persistent headaches   • Hyperlipidemia, unspecified     Dyslipidemia   • Nasal congestion 07/08/2016    Head congestion   • Nasal congestion 07/08/2016    Head congestion   • Other chest pain 05/12/2017    Chest tightness or pressure   • Other conditions influencing health status     Head External Swelling (___ Cm)   • Personal history of other benign  neoplasm 03/05/2020    History of other benign neoplasm   • Personal history of other diseases of the musculoskeletal system and connective tissue 07/12/2013    History of backache   • Personal history of other diseases of the musculoskeletal system and connective tissue     History of low back pain   • Personal history of other diseases of the nervous system and sense organs 06/29/2016    History of earache   • Personal history of other mental and behavioral disorders 07/28/2017    History of anxiety disorder   • Personal history of other mental and behavioral disorders 05/09/2018    History of anxiety disorder   • Personal history of other mental and behavioral disorders 01/13/2017    History of anxiety disorder   • Personal history of other mental and behavioral disorders 02/12/2018    History of anxiety disorder   • Personal history of other mental and behavioral disorders 12/05/2018    History of anxiety disorder   • Personal history of other specified conditions 07/08/2016    History of dizziness   • Personal history of other specified conditions 05/22/2020    History of vertigo   • Personal history of other specified conditions 05/12/2017    History of excessive sweating   • Personal history of other specified conditions 08/16/2021    History of edema   • Unspecified visual loss     Vision problems     Past Surgical History:   Procedure Laterality Date   • MR HEAD ANGIO WO IV CONTRAST  4/9/2013    MR HEAD ANGIO WO IV CONTRAST 4/9/2013 [facility]   • MR HEAD ANGIO WO IV CONTRAST  9/4/2012    MR HEAD ANGIO WO IV CONTRAST 9/4/2012 [facility]   • OTHER SURGICAL HISTORY  07/30/2019    Hip replacement   • OTHER SURGICAL HISTORY  07/30/2019    Lumpectomy   • OTHER SURGICAL HISTORY  07/14/2014    Prior Surgical Procedure Not Done   • US GUIDED ABDOMINAL PARACENTESIS  9/28/2020    US GUIDED ABDOMINAL PARACENTESIS 9/28/2020 [facility]     No family history on file.  Social History     Tobacco  Use   • Smoking status: Never   • Smokeless tobacco: Never   Substance Use Topics   • Alcohol use: Not on file   • Drug use: Not on file       Physical Exam   ED Triage Vitals [10/04/23 0316]   Temp Heart Rate Resp BP   37 °C (98.6 °F) 61 16 130/60      SpO2 Temp Source Heart Rate Source Patient Position   99 % Temporal Monitor --      BP Location FiO2 (%)     Left arm --       Physical Exam  Constitutional:       General: She is not in acute distress.     Appearance: She is not toxic-appearing.   HENT:      Head: Normocephalic and atraumatic.      Nose: No congestion.      Mouth/Throat:      Mouth: Mucous membranes are moist.   Eyes:      General: No scleral icterus.     Extraocular Movements: Extraocular movements intact.      Pupils: Pupils are equal, round, and reactive to light.   Cardiovascular:      Rate and Rhythm: Normal rate and regular rhythm.      Pulses: Normal pulses.   Pulmonary:      Effort: Pulmonary effort is normal. No respiratory distress.   Abdominal:      General: There is no distension.      Palpations: Abdomen is soft.      Tenderness: There is no abdominal tenderness. There is no guarding.   Musculoskeletal:         General: Normal range of motion.      Cervical back: Normal range of motion and neck supple. No rigidity.      Right lower leg: No edema.      Left lower leg: No edema.      Comments: Nontender over the midline spine or paraspinal musculatures, chest wall, or abdomen.  No obvious bruising noted.   Skin:     General: Skin is warm and dry.      Capillary Refill: Capillary refill takes less than 2 seconds.   Neurological:      General: No focal deficit present.      Mental Status: She is alert and oriented to person, place, and time.      Gait: Gait normal.   Psychiatric:         Mood and Affect: Mood normal.         Behavior: Behavior normal.         Judgment: Judgment normal.       ED Course & MDM   Diagnoses as of 10/31/23 1348   Fall, initial encounter   Closed head injury,  initial encounter       Medical Decision Making  04:37 12 lead EKG interpreted by my ED attending reveals sinus rhythm with a rate of 56 beats per minute.  Normal Axis.  There are no ST elevations. T wave inversions in V1. No acute ischemic changes identified.    ED course / MDM     Summary:  Patient presented with a fall, unsure how she fell, unsure if she lost consciousness, struck her head on the ground, was on the ground for at least 90 minutes prior to call EMS.  She denies any complaints at this time.  Vital signs are stable, patient appears nontoxic.  No tenderness or signs of trauma on exam.  Pan CT scan, labs, and EKG ordered.  EKG nonischemic.  Initial labs show a mildly elevated BNP of 151, GFR 52 and creatinine 1.06, no significant electrode abnormalities, negative lactate.  Normal coagulation screen.  Mild leukocytosis of 13.6, normal hemoglobin.  CK is normal.  Patient case signed out to ED attending Dr. Jane due to shift change, pending CT scans, labs, reevaluation, and final disposition.     I saw and evaluated the patient.  I personally obtained the key and critical portions of the history and physical exam or was physically present for key and critical portions performed by the resident/midlevel. I reviewed the resident's/midlevel's documentation and discussed the patient with the resident.  I agree with the resident's medical decision making as documented in the resident's/midlevel's note. This note supercedes documentation by midlevel/resident.    Chief complaint: Fall    History of present illness: Patient 73-year-old female presenting to the emergency department after a fall.  According to the patient, she was at home ambulating with a walker and fell.  The patient states that she fell backwards and her walker landed on top of her.  Patient does not believe that she lost consciousness when this occurred.  The patient does admit to hitting her head on the ground.  Patient was on the ground  for at least 90 minutes the patient was able to crawl to the phone and call her daughter who called EMS.  Patient denies any pain at this time.  The patient was brought to the emergency department for further evaluation.    Physical examination: General: Patient is an age-appropriate well-appearing female resting comfortably in the examination table  Cardiovascular: Patient has a regular rate and rhythm  Lungs: Lungs are clear to auscultation bilaterally  Abdomen: Patient's abdomen is soft nontender nondistended.  Patient has normal bowel sounds. There is no guarding or rebound tenderness.  Neuro: Patient is alert she moves all 4 extremities spontaneously there are no lateralizing neurologic deficits cranial nerves III through XII are intact.    Medical decision making: Patient courtney stable throughout her time in the emergency department.  CBC demonstrated a white cell count of 13.6.  Patient's Chem-7 was within normal limits LFTs were all within normal limits.  BNP was 151 troponin creatinine kinase lactate INR and PTT were all within normal limits.  CAT scan of the patient's head neck thoracic lumbar spine and CAT scan of the patient's chest abdomen and pelvis demonstrated a small pericardial effusion with groundglass infiltrate density laterally posterior in the right middle lobe.    Patient presents to the emergency department after a fall.  Work-up was performed as above.  Nothing was broken and no posttraumatic abnormalities were appreciated.  Patient and family were reassured.  Given the patient's negative work-up and comfortable the patient being discharged home to follow-up with a primary care physician and was emphasized the patient return to the emergency department for worsening symptoms.  Patient expressed understanding and agreement.  The patient was then discharged home in otherwise stable condition.        Procedure  Procedures     Brenda Arriaga PA-C  10/04/23 0647       Ernesto Jane,  MD  10/13/23 1613       Ernesto Jane MD  10/31/23 1347

## 2023-10-04 NOTE — ED TRIAGE NOTES
Pt arrived to the ED via [facility] with a chief complaint of a fall. Pt had a mechanical fall, denies loc or use of blood thinners. Pt was on the ground for 90 minutes unable to get up, difficulty ambulating. Denies injuries or pain. Medical hx of CHF.  Pt endorses sleepiness after taking her sleep meds. No change in mental status.   
pt reports never having a mammogram before

## 2023-10-05 ENCOUNTER — TELEPHONE (OUTPATIENT)
Dept: PRIMARY CARE | Facility: CLINIC | Age: 83
End: 2023-10-05
Payer: MEDICARE

## 2023-10-05 ENCOUNTER — TELEPHONE (OUTPATIENT)
Dept: CARDIOLOGY | Facility: CLINIC | Age: 83
End: 2023-10-05
Payer: MEDICARE

## 2023-10-05 NOTE — TELEPHONE ENCOUNTER
Pt daughter called and st pt was in the hospital overnight. She had passed out and hit her head. Her daughter is very concerned about her blood work results and would like to discuss those with you. She would like to do a VV, are you okay with that or does she need to come in to the office?

## 2023-10-12 ENCOUNTER — TELEMEDICINE (OUTPATIENT)
Dept: PRIMARY CARE | Facility: CLINIC | Age: 83
End: 2023-10-12
Payer: MEDICARE

## 2023-10-12 DIAGNOSIS — E78.00 HYPERCHOLESTEROLEMIA: ICD-10-CM
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
## 2023-10-12 DIAGNOSIS — M11.20 PSEUDOGOUT: ICD-10-CM

## 2023-10-12 DIAGNOSIS — D72.829 LEUKOCYTOSIS, UNSPECIFIED TYPE: ICD-10-CM

## 2023-10-12 DIAGNOSIS — I10 PRIMARY HYPERTENSION: ICD-10-CM

## 2023-10-12 DIAGNOSIS — F41.9 ANXIETY: ICD-10-CM

## 2023-10-12 DIAGNOSIS — K21.9 GASTROESOPHAGEAL REFLUX DISEASE WITHOUT ESOPHAGITIS: ICD-10-CM

## 2023-10-12 DIAGNOSIS — E03.9 HYPOTHYROIDISM, UNSPECIFIED TYPE: ICD-10-CM

## 2023-10-12 PROCEDURE — 99215 OFFICE O/P EST HI 40 MIN: CPT | Performed by: FAMILY MEDICINE

## 2023-10-12 RX ORDER — LORAZEPAM 1 MG/1
1 TABLET ORAL DAILY PRN
Qty: 30 TABLET | Refills: 2 | Status: SHIPPED | OUTPATIENT
Start: 2023-10-12 | End: 2024-02-05 | Stop reason: SDUPTHER

## 2023-10-12 RX ORDER — METOPROLOL SUCCINATE 25 MG/1
25 TABLET, EXTENDED RELEASE ORAL DAILY
Qty: 90 TABLET | Refills: 1 | Status: SHIPPED | OUTPATIENT
Start: 2023-10-12 | End: 2023-11-17 | Stop reason: SDUPTHER

## 2023-10-12 RX ORDER — FUROSEMIDE 20 MG/1
20 TABLET ORAL DAILY
Qty: 90 TABLET | Refills: 1 | Status: SHIPPED | OUTPATIENT
Start: 2023-10-12 | End: 2023-11-17 | Stop reason: SDUPTHER

## 2023-10-12 RX ORDER — FENOFIBRATE 145 MG/1
145 TABLET, FILM COATED ORAL DAILY
Qty: 90 TABLET | Refills: 1 | Status: SHIPPED | OUTPATIENT
Start: 2023-10-12 | End: 2024-02-05 | Stop reason: SDUPTHER

## 2023-10-12 RX ORDER — VERAPAMIL HYDROCHLORIDE 120 MG/1
120 CAPSULE, EXTENDED RELEASE ORAL DAILY
Qty: 90 CAPSULE | Refills: 1 | Status: SHIPPED | OUTPATIENT
Start: 2023-10-12 | End: 2023-11-17 | Stop reason: SDUPTHER

## 2023-10-12 RX ORDER — COLCHICINE 0.6 MG/1
0.6 TABLET ORAL DAILY
Qty: 90 TABLET | Refills: 1 | Status: SHIPPED | OUTPATIENT
Start: 2023-10-12 | End: 2023-10-25 | Stop reason: SDUPTHER

## 2023-10-12 RX ORDER — LORAZEPAM 0.5 MG/1
0.5 TABLET ORAL EVERY 12 HOURS PRN
Qty: 60 TABLET | Refills: 2 | Status: SHIPPED | OUTPATIENT
Start: 2023-10-12 | End: 2024-02-05 | Stop reason: SDUPTHER

## 2023-10-12 RX ORDER — PANTOPRAZOLE SODIUM 40 MG/1
40 TABLET, DELAYED RELEASE ORAL DAILY
Qty: 90 TABLET | Refills: 1 | Status: SHIPPED | OUTPATIENT
Start: 2023-10-12 | End: 2024-02-05 | Stop reason: SDUPTHER

## 2023-10-12 RX ORDER — LEVOTHYROXINE SODIUM 75 UG/1
75 TABLET ORAL DAILY
Qty: 90 TABLET | Refills: 1 | Status: SHIPPED | OUTPATIENT
Start: 2023-10-12 | End: 2024-02-05 | Stop reason: SDUPTHER

## 2023-10-12 RX ORDER — ROSUVASTATIN CALCIUM 10 MG/1
10 TABLET, COATED ORAL DAILY
Qty: 90 TABLET | Refills: 1 | Status: SHIPPED | OUTPATIENT
Start: 2023-10-12 | End: 2023-11-17 | Stop reason: SDUPTHER

## 2023-10-12 NOTE — PROGRESS NOTES
Subjective   Patient ID: Lisa Hassan is a 83 y.o. female who presents for virtual visit.    HPI pt was in ED on 10 /4/2023.  She fell backwards with walker on top of her and hit her head, question if passed out since does not remember what happened, all of a sudden was on ground w walker on top of her.   She had CT of head and spine and chest/abd and pelvis in ED.  She had cardiac workup and other labs. EKG w NSR.  Was told all was fine and sent home.  She does not have any pain or bruising.  No dizziness or lightheadedness at the time or prior    Daughter thinks week prior pt had chest cold and mild cough.  Her wbc was mildly elevated in ED and her Chest CT showed possible mild infiltrate.  She has not had any chills or fever or cough this week and feels in her usual state of health    Daughter requested this appt due to being told pt labs were all abnormal,  Reviewed with both of them today    Pt requesting all her meds be refilled.  Discussed her ativan today. She states does not make her loopy or sleepy and has been on the same for forty years.  Discussed fall risks etc w her and daughter,    Review of Systems    Objective   There were no vitals taken for this visit.    Physical Exam  Constitutional:       Appearance: Normal appearance.   Pulmonary:      Effort: Pulmonary effort is normal.      Comments: No cough during VV.  Voice is normal.  Not sob or wheezing  Neurological:      Mental Status: She is alert and oriented to person, place, and time.   Psychiatric:         Mood and Affect: Mood normal.         Thought Content: Thought content normal.         Judgment: Judgment normal.         Assessment/Plan   Problem List Items Addressed This Visit             ICD-10-CM    Gastroesophageal reflux disease K21.9    Relevant Medications    pantoprazole (ProtoNix) 40 mg EC tablet    Hypothyroidism E03.9    Relevant Medications    levothyroxine (Synthroid) 75 mcg tablet    Hypertension I10    Relevant Medications     verapamil ER (Veralan PM) 120 mg 24 hr capsule    metoprolol succinate XL (Toprol-XL) 25 mg 24 hr tablet    furosemide (Lasix) 20 mg tablet    Hypercholesterolemia E78.00    Relevant Medications    rosuvastatin (Crestor) 10 mg tablet    fenofibrate (Tricor) 145 mg tablet    Pseudogout M11.20    Relevant Medications    colchicine, gout, 0.6 mg tablet     Other Visit Diagnoses         Codes    Fall at home, subsequent encounter    -  Primary W19.XXXD, Y92.009    Anxiety     F41.9    Relevant Medications    LORazepam (Ativan) 0.5 mg tablet    LORazepam (Ativan) 1 mg tablet    Leukocytosis, unspecified type     D72.829    Relevant Orders    CBC and Auto Differential        Reviewed all labs and imaging from ED.  On CT showed possible pneumonia vs atelectasis and had high wbc.  Will get cbc tomorrow and decide if need further work up.  Pt feels fine at this time.  No resp s/s.  Daughter thinks had resp s/s prior but pt denies.    Refilled all meds, no changes made    Disucssed ativan and problems w this in pts age group.  Hx of long time use.  Refilled.  OARRs checked and no suspicious activity.  Pt denies any side effects and helps her anxiety greatly    Follow up in office 1-2 weeks after above.

## 2023-10-25 DIAGNOSIS — M11.20 PSEUDOGOUT: ICD-10-CM

## 2023-10-25 RX ORDER — COLCHICINE 0.6 MG/1
0.6 TABLET ORAL DAILY
Qty: 90 TABLET | Refills: 1 | Status: SHIPPED | OUTPATIENT
Start: 2023-10-25

## 2023-11-17 DIAGNOSIS — E78.00 HYPERCHOLESTEROLEMIA: ICD-10-CM

## 2023-11-17 DIAGNOSIS — I10 PRIMARY HYPERTENSION: ICD-10-CM

## 2023-11-17 RX ORDER — VERAPAMIL HYDROCHLORIDE 120 MG/1
120 CAPSULE, EXTENDED RELEASE ORAL DAILY
Qty: 90 CAPSULE | Refills: 3 | Status: SHIPPED | OUTPATIENT
Start: 2023-11-17 | End: 2024-02-05 | Stop reason: SDUPTHER

## 2023-11-17 RX ORDER — METOPROLOL SUCCINATE 25 MG/1
25 TABLET, EXTENDED RELEASE ORAL DAILY
Qty: 90 TABLET | Refills: 3 | Status: SHIPPED | OUTPATIENT
Start: 2023-11-17 | End: 2024-02-05 | Stop reason: SDUPTHER

## 2023-11-17 RX ORDER — ROSUVASTATIN CALCIUM 10 MG/1
10 TABLET, COATED ORAL DAILY
Qty: 90 TABLET | Refills: 3 | Status: SHIPPED | OUTPATIENT
Start: 2023-11-17 | End: 2024-02-05 | Stop reason: SDUPTHER

## 2023-11-17 RX ORDER — FUROSEMIDE 20 MG/1
20 TABLET ORAL DAILY
Qty: 90 TABLET | Refills: 3 | Status: SHIPPED | OUTPATIENT
Start: 2023-11-17 | End: 2023-12-04

## 2023-12-13 ENCOUNTER — TELEPHONE (OUTPATIENT)
Dept: PRIMARY CARE | Facility: CLINIC | Age: 83
End: 2023-12-13
Payer: MEDICARE

## 2023-12-13 NOTE — TELEPHONE ENCOUNTER
Pt need her a refill of ativan 1mg  and ativan 0.5mg sent to [facility] in [place]. She not had any sleep in days

## 2024-02-05 ENCOUNTER — OFFICE VISIT (OUTPATIENT)
Dept: PRIMARY CARE | Facility: CLINIC | Age: 84
End: 2024-02-05
Payer: MEDICARE

## 2024-02-05 VITALS
BODY MASS INDEX: 21.6 KG/M2 | TEMPERATURE: 97.3 F | HEIGHT: 60 IN | SYSTOLIC BLOOD PRESSURE: 114 MMHG | DIASTOLIC BLOOD PRESSURE: 71 MMHG | OXYGEN SATURATION: 98 % | RESPIRATION RATE: 18 BRPM | WEIGHT: 110 LBS | HEART RATE: 70 BPM

## 2024-02-05 DIAGNOSIS — Z12.31 BREAST CANCER SCREENING BY MAMMOGRAM: ICD-10-CM

## 2024-02-05 DIAGNOSIS — K21.9 GASTROESOPHAGEAL REFLUX DISEASE WITHOUT ESOPHAGITIS: ICD-10-CM

## 2024-02-05 DIAGNOSIS — K59.00 CONSTIPATION, UNSPECIFIED CONSTIPATION TYPE: ICD-10-CM

## 2024-02-05 DIAGNOSIS — E55.9 VITAMIN D DEFICIENCY: ICD-10-CM

## 2024-02-05 DIAGNOSIS — I10 PRIMARY HYPERTENSION: ICD-10-CM

## 2024-02-05 DIAGNOSIS — N18.31 CHRONIC KIDNEY DISEASE, STAGE 3A (MULTI): ICD-10-CM

## 2024-02-05 DIAGNOSIS — E78.2 MIXED HYPERLIPIDEMIA: ICD-10-CM

## 2024-02-05 DIAGNOSIS — F41.9 ANXIETY: ICD-10-CM

## 2024-02-05 DIAGNOSIS — Z00.00 MEDICARE ANNUAL WELLNESS VISIT, SUBSEQUENT: Primary | ICD-10-CM

## 2024-02-05 DIAGNOSIS — E03.9 HYPOTHYROIDISM, UNSPECIFIED TYPE: ICD-10-CM

## 2024-02-05 DIAGNOSIS — E78.00 HYPERCHOLESTEROLEMIA: ICD-10-CM

## 2024-02-05 PROCEDURE — 1159F MED LIST DOCD IN RCRD: CPT | Performed by: FAMILY MEDICINE

## 2024-02-05 PROCEDURE — 99214 OFFICE O/P EST MOD 30 MIN: CPT | Performed by: FAMILY MEDICINE

## 2024-02-05 PROCEDURE — 1170F FXNL STATUS ASSESSED: CPT | Performed by: FAMILY MEDICINE

## 2024-02-05 PROCEDURE — 3078F DIAST BP <80 MM HG: CPT | Performed by: FAMILY MEDICINE

## 2024-02-05 PROCEDURE — 3074F SYST BP LT 130 MM HG: CPT | Performed by: FAMILY MEDICINE

## 2024-02-05 PROCEDURE — 1036F TOBACCO NON-USER: CPT | Performed by: FAMILY MEDICINE

## 2024-02-05 PROCEDURE — 1160F RVW MEDS BY RX/DR IN RCRD: CPT | Performed by: FAMILY MEDICINE

## 2024-02-05 PROCEDURE — 1125F AMNT PAIN NOTED PAIN PRSNT: CPT | Performed by: FAMILY MEDICINE

## 2024-02-05 PROCEDURE — G0439 PPPS, SUBSEQ VISIT: HCPCS | Performed by: FAMILY MEDICINE

## 2024-02-05 PROCEDURE — 1123F ACP DISCUSS/DSCN MKR DOCD: CPT | Performed by: FAMILY MEDICINE

## 2024-02-05 RX ORDER — VERAPAMIL HYDROCHLORIDE 120 MG/1
120 CAPSULE, EXTENDED RELEASE ORAL DAILY
Qty: 90 CAPSULE | Refills: 3 | Status: SHIPPED | OUTPATIENT
Start: 2024-02-05 | End: 2024-06-10 | Stop reason: SDUPTHER

## 2024-02-05 RX ORDER — LEVOTHYROXINE SODIUM 75 UG/1
75 TABLET ORAL DAILY
Qty: 90 TABLET | Refills: 1 | Status: SHIPPED | OUTPATIENT
Start: 2024-02-05 | End: 2024-03-06 | Stop reason: DRUGHIGH

## 2024-02-05 RX ORDER — ROSUVASTATIN CALCIUM 10 MG/1
10 TABLET, COATED ORAL DAILY
Qty: 90 TABLET | Refills: 3 | Status: SHIPPED | OUTPATIENT
Start: 2024-02-05 | End: 2024-06-10 | Stop reason: SDUPTHER

## 2024-02-05 RX ORDER — FUROSEMIDE 20 MG/1
40 TABLET ORAL DAILY
Qty: 180 TABLET | Refills: 3 | Status: SHIPPED | OUTPATIENT
Start: 2024-02-05 | End: 2024-06-10 | Stop reason: SDUPTHER

## 2024-02-05 RX ORDER — LORAZEPAM 1 MG/1
1 TABLET ORAL DAILY PRN
Qty: 30 TABLET | Refills: 2 | Status: SHIPPED | OUTPATIENT
Start: 2024-02-05 | End: 2024-06-10 | Stop reason: SDUPTHER

## 2024-02-05 RX ORDER — METOPROLOL SUCCINATE 25 MG/1
25 TABLET, EXTENDED RELEASE ORAL DAILY
Qty: 90 TABLET | Refills: 3 | Status: SHIPPED | OUTPATIENT
Start: 2024-02-05 | End: 2024-06-10 | Stop reason: SDUPTHER

## 2024-02-05 RX ORDER — LORAZEPAM 0.5 MG/1
0.5 TABLET ORAL EVERY 12 HOURS PRN
Qty: 60 TABLET | Refills: 2 | Status: SHIPPED | OUTPATIENT
Start: 2024-02-05 | End: 2024-06-10 | Stop reason: SDUPTHER

## 2024-02-05 RX ORDER — FENOFIBRATE 145 MG/1
145 TABLET, FILM COATED ORAL DAILY
Qty: 90 TABLET | Refills: 1 | Status: SHIPPED | OUTPATIENT
Start: 2024-02-05 | End: 2024-02-27 | Stop reason: WASHOUT

## 2024-02-05 RX ORDER — PANTOPRAZOLE SODIUM 40 MG/1
40 TABLET, DELAYED RELEASE ORAL DAILY
Qty: 90 TABLET | Refills: 1 | Status: SHIPPED | OUTPATIENT
Start: 2024-02-05 | End: 2024-06-10 | Stop reason: SDUPTHER

## 2024-02-05 ASSESSMENT — ANXIETY QUESTIONNAIRES
2. NOT BEING ABLE TO STOP OR CONTROL WORRYING: NOT AT ALL
1. FEELING NERVOUS, ANXIOUS, OR ON EDGE: NOT AT ALL
7. FEELING AFRAID AS IF SOMETHING AWFUL MIGHT HAPPEN: NOT AT ALL
3. WORRYING TOO MUCH ABOUT DIFFERENT THINGS: NOT AT ALL
GAD7 TOTAL SCORE: 2
4. TROUBLE RELAXING: SEVERAL DAYS
5. BEING SO RESTLESS THAT IT IS HARD TO SIT STILL: NOT AT ALL
6. BECOMING EASILY ANNOYED OR IRRITABLE: SEVERAL DAYS
IF YOU CHECKED OFF ANY PROBLEMS ON THIS QUESTIONNAIRE, HOW DIFFICULT HAVE THESE PROBLEMS MADE IT FOR YOU TO DO YOUR WORK, TAKE CARE OF THINGS AT HOME, OR GET ALONG WITH OTHER PEOPLE: SOMEWHAT DIFFICULT

## 2024-02-05 ASSESSMENT — ACTIVITIES OF DAILY LIVING (ADL)
TAKING_MEDICATION: INDEPENDENT
DRESSING: INDEPENDENT
MANAGING_FINANCES: INDEPENDENT
BATHING: INDEPENDENT
GROCERY_SHOPPING: TOTAL CARE
DOING_HOUSEWORK: TOTAL CARE

## 2024-02-05 ASSESSMENT — ENCOUNTER SYMPTOMS
DEPRESSION: 1
LOSS OF SENSATION IN FEET: 0
OCCASIONAL FEELINGS OF UNSTEADINESS: 1

## 2024-02-05 ASSESSMENT — PATIENT HEALTH QUESTIONNAIRE - PHQ9
1. LITTLE INTEREST OR PLEASURE IN DOING THINGS: NOT AT ALL
2. FEELING DOWN, DEPRESSED OR HOPELESS: NOT AT ALL
10. IF YOU CHECKED OFF ANY PROBLEMS, HOW DIFFICULT HAVE THESE PROBLEMS MADE IT FOR YOU TO DO YOUR WORK, TAKE CARE OF THINGS AT HOME, OR GET ALONG WITH OTHER PEOPLE: NOT DIFFICULT AT ALL
9. THOUGHTS THAT YOU WOULD BE BETTER OFF DEAD, OR OF HURTING YOURSELF: NOT AT ALL
7. TROUBLE CONCENTRATING ON THINGS, SUCH AS READING THE NEWSPAPER OR WATCHING TELEVISION: NOT AT ALL
SUM OF ALL RESPONSES TO PHQ9 QUESTIONS 1 AND 2: 0
8. MOVING OR SPEAKING SO SLOWLY THAT OTHER PEOPLE COULD HAVE NOTICED. OR THE OPPOSITE, BEING SO FIGETY OR RESTLESS THAT YOU HAVE BEEN MOVING AROUND A LOT MORE THAN USUAL: NOT AT ALL
4. FEELING TIRED OR HAVING LITTLE ENERGY: SEVERAL DAYS
SUM OF ALL RESPONSES TO PHQ QUESTIONS 1-9: 4
5. POOR APPETITE OR OVEREATING: SEVERAL DAYS
3. TROUBLE FALLING OR STAYING ASLEEP OR SLEEPING TOO MUCH: MORE THAN HALF THE DAYS
6. FEELING BAD ABOUT YOURSELF - OR THAT YOU ARE A FAILURE OR HAVE LET YOURSELF OR YOUR FAMILY DOWN: NOT AT ALL

## 2024-02-05 ASSESSMENT — PAIN SCALES - GENERAL: PAINLEVEL: 10-WORST PAIN EVER

## 2024-02-05 NOTE — PROGRESS NOTES
Subjective   Reason for Visit: Lisa Hassan is an 83 y.o. female here for a Medicare Wellness visit.     Past Medical, Surgical, and Family History reviewed and updated in chart.    Reviewed all medications by prescribing practitioner or clinical pharmacist (such as prescriptions, OTCs, herbal therapies and supplements) and documented in the medical record.  Medicare Wellness Billing Compliance Satisfied    *This is a visual tool to show completion of required items on the day of the visit. Green checks will only appear on the date of visit.    Review all medications by prescribing practitioner or clinical pharmacist (such as prescriptions, OTCs, herbal therapies and supplements) documented in the medical record    Past Medical, Surgical, and Family History reviewed and updated in chart    Tobacco Use Reviewed    Alcohol Use Reviewed    Illicit Drug Use Reviewed    PHQ2/9    Falls in Last Year Reviewed    Home Safety Risk Factors Reviewed    Cognitive Impairment Reviewed    Patient Self Assessment and Health Status    Current Diet Reviewed    Exercise Frequency    ADL - Hearing Impairment    ADL - Bathing    ADL - Dressing    ADL - Walks in Home    IADL - Managing Finances    IADL - Grocery Shopping    IADL - Taking Medications    IADL - Doing Housework      HPI  The patient presents to the clinic for an annual medicare wellness visit. She has past medical history of hypertension, hyperlipidemia, hypothyroidism, GERD, STATON, allergic rhinitis, IBS, CKD (stage 3a), anxiety, osteoarthritis, chronic headaches, anarthritic rheumatoid disease, lumbar spinal stenosis, and reactive airway disease.    She has been compliant with medication and denies any side-effects to current medication.  Not compliant with her lasix    The patient continues on Ativan medication. She states that this medication is working well and she denies any side-effects. She has been on for years and she states needs for anxiety Her  most recent UDS/CSA was done on 06/22/2023.    The patient reports that she does not eat until late afternoon (~3-4 pm). She also reports a general lack of appetite (current weight: 110 lbs, down 8 lbs from June 2023).    She reports mild tremor in her bilateral hands. She fears that she may drop items (dishes) in the future secondary to this condition.  She has deformity from OA also and tremor is very slight    The patient reports symptoms of overactive bladder and urinary incontinence. She inquires about treatment options for this condition.  She does nt want another medication, discussed Myrbetriq for her.  She does not want to see urologist    The patient also reports varicose veins in her BLE. Notably, she continues on Lasix 40 mg daily. She states that this medication is working well and she denies any side-effects but she takes every other day and not daily as prescribed by cardiology and takes only one per day    She reports symptoms of constipation. She has attempted to use OTC Dulcolax to alleviate her symptoms. She states that this medication is working somewhat well.  She used to use mag citrate liquid once per week but can not find and this was suggested her last hospitalization    Her most recent mammogram was done in May 2019. Her most recent colonoscopy was done in January 2013 (5-year recall). She declines to have another colonoscopy screening. She declines to receive a flu vaccine in the clinic today.  She does want to have her mamm done but not dexa    Pt wants all her meds refilled.  Bp is good range.  Denies cp and sob.  She is taking her thyroid med correctly.  Her gerd is controlled on daily PPI.  She is tolerating her statin well and getting her labs drawn today, she is fasting    Patient Care Team:  Corinne Richards DO as PCP - General  Corinne Richards DO as PCP - [employer] PCP     Review of Systems    Objective   Vitals:  /71   Pulse 70   Temp 36.3  °C (97.3 °F)   Resp 18   Ht 1.524 m (5')   Wt 49.9 kg (110 lb)   SpO2 98%   BMI 21.48 kg/m²       Physical Exam  Neck:      Vascular: No carotid bruit.   Cardiovascular:      Rate and Rhythm: Normal rate and regular rhythm.      Pulses: Normal pulses.   Pulmonary:      Effort: Pulmonary effort is normal.      Breath sounds: Normal breath sounds.   Abdominal:      General: Bowel sounds are normal.      Palpations: Abdomen is soft. There is no mass.      Tenderness: There is no abdominal tenderness.   Musculoskeletal:         General: Deformity present.      Right lower leg: Edema present.      Left lower leg: Edema present.      Comments: Pt has very mild ankle edema, she has multiple varicosities also    Deformities bilat hands arthritis changes   Lymphadenopathy:      Cervical: No cervical adenopathy.   Skin:     General: Skin is warm and dry.   Neurological:      Mental Status: She is alert and oriented to person, place, and time.   Psychiatric:         Mood and Affect: Mood normal.         Behavior: Behavior normal.         Thought Content: Thought content normal.         Judgment: Judgment normal.         Assessment/Plan   Problem List Items Addressed This Visit       Chronic kidney disease, stage 3a (CMS/HCC)    Gastroesophageal reflux disease    Relevant Medications    pantoprazole (ProtoNix) 40 mg EC tablet    Hypothyroidism    Relevant Medications    levothyroxine (Synthroid) 75 mcg tablet    Other Relevant Orders    TSH with reflex to Free T4 if abnormal    Hypertension    Relevant Medications    verapamil ER (Veralan PM) 120 mg 24 hr capsule    metoprolol succinate XL (Toprol-XL) 25 mg 24 hr tablet    furosemide (Lasix) 20 mg tablet    Other Relevant Orders    CBC and Auto Differential    Vitamin D deficiency    Relevant Orders    Vitamin D 25-Hydroxy,Total (for eval of Vitamin D levels)    Hypercholesterolemia    Relevant Medications    rosuvastatin (Crestor) 10 mg tablet    fenofibrate (Tricor) 145  mg tablet    Constipation    Mixed hyperlipidemia    Relevant Orders    Comprehensive Metabolic Panel    Lipid Panel     Other Visit Diagnoses       Medicare annual wellness visit, subsequent    -  Primary    Breast cancer screening by mammogram        Relevant Orders    BI mammo bilateral screening tomosynthesis    Anxiety        Relevant Medications    LORazepam (Ativan) 1 mg tablet    LORazepam (Ativan) 0.5 mg tablet                 Labs (CBC, CMP, lipid panel, TSH, vitamin D) were ordered for the patient. She is fasting and intends to complete her labs today. The clinic will contact the patient upon receiving her lab results.    A bilateral mammogram screening was ordered for the patient. The clinic will contact the patient upon receiving her screening results.    Her medication list was updated. The patient received refills for current medication (fenofibrate 145 mg, diclofenac sodium 1%, verapamil  mg, rosuvastatin 10 mg, pantoprazole 40 mg, metoprolol succinate XL 25 mg, Ativan 1 mg, Ativan 0.5 mg, levothyroxine 75 mcg, Lasix 20 mg). She was instructed to continue taking medication as previously directed. In regards to her Ativan prescriptions, OARRS were reviewed. Risk and benefit ratio was assessed. No suspicious activity was observed. A CSA was conducted on the patient in the clinic today. She intends on bringing a urine sample (for the UDS) to the clinic later this week.    In regards to concerns with a mild tremor in the bilateral hands, the patient was advised that this is a common symptom for individuals of her age. She was instructed to continue monitoring symptoms for exacerbation.    In regards to concerns with symptoms of overactive bladder and urinary incontinence, treatment options (Myrbetriq) were discussed with the patient. She will consider trying Myrbetriq medication in the future if her symptoms continue to progress. Continue monitoring symptoms for exacerbation.    In regards to  concerns with varicose veins in the BLE, the patient was advised to continue taking Lasix medication as previously directed. Continue monitoring symptoms for improvement/exacerbation.    Prospective immunizations (Prevnar 20) were discussed with the patient. She will consider receiving the Prevnar 20 vaccine in the future.    She will follow-up in 3 months, unless otherwise needed.    Scribe Attestation  By signing my name below, IRemigio Scribe   attest that this documentation has been prepared under the direction and in the presence of Corinne Richards DO.

## 2024-02-27 ENCOUNTER — LAB (OUTPATIENT)
Dept: LAB | Facility: LAB | Age: 84
End: 2024-02-27
Payer: MEDICARE

## 2024-02-27 ENCOUNTER — HOSPITAL ENCOUNTER (OUTPATIENT)
Dept: RADIOLOGY | Facility: CLINIC | Age: 84
Discharge: HOME | End: 2024-02-27
Payer: MEDICARE

## 2024-02-27 ENCOUNTER — OFFICE VISIT (OUTPATIENT)
Dept: CARDIOLOGY | Facility: CLINIC | Age: 84
End: 2024-02-27
Payer: MEDICARE

## 2024-02-27 VITALS
SYSTOLIC BLOOD PRESSURE: 121 MMHG | BODY MASS INDEX: 21.88 KG/M2 | WEIGHT: 111.44 LBS | HEART RATE: 59 BPM | OXYGEN SATURATION: 98 % | HEIGHT: 60 IN | DIASTOLIC BLOOD PRESSURE: 65 MMHG

## 2024-02-27 DIAGNOSIS — R06.02 SHORTNESS OF BREATH: ICD-10-CM

## 2024-02-27 DIAGNOSIS — I50.30 HEART FAILURE WITH PRESERVED LEFT VENTRICULAR FUNCTION (HFPEF) (MULTI): ICD-10-CM

## 2024-02-27 DIAGNOSIS — E78.2 MIXED HYPERLIPIDEMIA: ICD-10-CM

## 2024-02-27 DIAGNOSIS — E03.9 HYPOTHYROIDISM, UNSPECIFIED TYPE: ICD-10-CM

## 2024-02-27 DIAGNOSIS — I10 PRIMARY HYPERTENSION: ICD-10-CM

## 2024-02-27 DIAGNOSIS — E55.9 VITAMIN D DEFICIENCY: ICD-10-CM

## 2024-02-27 DIAGNOSIS — R06.02 SHORTNESS OF BREATH: Primary | ICD-10-CM

## 2024-02-27 LAB
25(OH)D3 SERPL-MCNC: 39 NG/ML (ref 30–100)
ALBUMIN SERPL BCP-MCNC: 4.1 G/DL (ref 3.4–5)
ALP SERPL-CCNC: 145 U/L (ref 33–136)
ALT SERPL W P-5'-P-CCNC: 38 U/L (ref 7–45)
ANION GAP SERPL CALC-SCNC: 15 MMOL/L (ref 10–20)
AST SERPL W P-5'-P-CCNC: 48 U/L (ref 9–39)
BASOPHILS # BLD AUTO: 0.06 X10*3/UL (ref 0–0.1)
BASOPHILS NFR BLD AUTO: 0.6 %
BILIRUB SERPL-MCNC: 0.6 MG/DL (ref 0–1.2)
BNP SERPL-MCNC: 93 PG/ML (ref 0–99)
BUN SERPL-MCNC: 25 MG/DL (ref 6–23)
CALCIUM SERPL-MCNC: 10 MG/DL (ref 8.6–10.6)
CHLORIDE SERPL-SCNC: 106 MMOL/L (ref 98–107)
CHOLEST SERPL-MCNC: 120 MG/DL (ref 0–199)
CHOLESTEROL/HDL RATIO: 3.4
CO2 SERPL-SCNC: 27 MMOL/L (ref 21–32)
CREAT SERPL-MCNC: 1.19 MG/DL (ref 0.5–1.05)
EGFRCR SERPLBLD CKD-EPI 2021: 45 ML/MIN/1.73M*2
EOSINOPHIL # BLD AUTO: 0.31 X10*3/UL (ref 0–0.4)
EOSINOPHIL NFR BLD AUTO: 2.9 %
ERYTHROCYTE [DISTWIDTH] IN BLOOD BY AUTOMATED COUNT: 14.3 % (ref 11.5–14.5)
GLUCOSE SERPL-MCNC: 78 MG/DL (ref 74–99)
HCT VFR BLD AUTO: 47 % (ref 36–46)
HDLC SERPL-MCNC: 35.6 MG/DL
HGB BLD-MCNC: 14.6 G/DL (ref 12–16)
IMM GRANULOCYTES # BLD AUTO: 0.04 X10*3/UL (ref 0–0.5)
IMM GRANULOCYTES NFR BLD AUTO: 0.4 % (ref 0–0.9)
LDLC SERPL CALC-MCNC: 54 MG/DL
LYMPHOCYTES # BLD AUTO: 4.03 X10*3/UL (ref 0.8–3)
LYMPHOCYTES NFR BLD AUTO: 38.2 %
MAGNESIUM SERPL-MCNC: 1.81 MG/DL (ref 1.6–2.4)
MCH RBC QN AUTO: 28.8 PG (ref 26–34)
MCHC RBC AUTO-ENTMCNC: 31.1 G/DL (ref 32–36)
MCV RBC AUTO: 93 FL (ref 80–100)
MONOCYTES # BLD AUTO: 0.55 X10*3/UL (ref 0.05–0.8)
MONOCYTES NFR BLD AUTO: 5.2 %
NEUTROPHILS # BLD AUTO: 5.55 X10*3/UL (ref 1.6–5.5)
NEUTROPHILS NFR BLD AUTO: 52.7 %
NON HDL CHOLESTEROL: 84 MG/DL (ref 0–149)
NRBC BLD-RTO: 0 /100 WBCS (ref 0–0)
PLATELET # BLD AUTO: 209 X10*3/UL (ref 150–450)
POTASSIUM SERPL-SCNC: 4.4 MMOL/L (ref 3.5–5.3)
PROT SERPL-MCNC: 6.1 G/DL (ref 6.4–8.2)
RBC # BLD AUTO: 5.07 X10*6/UL (ref 4–5.2)
SODIUM SERPL-SCNC: 144 MMOL/L (ref 136–145)
T4 FREE SERPL-MCNC: 2.11 NG/DL (ref 0.78–1.48)
TRIGL SERPL-MCNC: 154 MG/DL (ref 0–149)
TSH SERPL-ACNC: 0.04 MIU/L (ref 0.44–3.98)
VLDL: 31 MG/DL (ref 0–40)
WBC # BLD AUTO: 10.5 X10*3/UL (ref 4.4–11.3)

## 2024-02-27 PROCEDURE — 1123F ACP DISCUSS/DSCN MKR DOCD: CPT | Performed by: INTERNAL MEDICINE

## 2024-02-27 PROCEDURE — 80061 LIPID PANEL: CPT

## 2024-02-27 PROCEDURE — 84439 ASSAY OF FREE THYROXINE: CPT

## 2024-02-27 PROCEDURE — 36415 COLL VENOUS BLD VENIPUNCTURE: CPT

## 2024-02-27 PROCEDURE — 83735 ASSAY OF MAGNESIUM: CPT

## 2024-02-27 PROCEDURE — 85025 COMPLETE CBC W/AUTO DIFF WBC: CPT

## 2024-02-27 PROCEDURE — 84443 ASSAY THYROID STIM HORMONE: CPT

## 2024-02-27 PROCEDURE — 71046 X-RAY EXAM CHEST 2 VIEWS: CPT

## 2024-02-27 PROCEDURE — 1125F AMNT PAIN NOTED PAIN PRSNT: CPT | Performed by: INTERNAL MEDICINE

## 2024-02-27 PROCEDURE — 82306 VITAMIN D 25 HYDROXY: CPT

## 2024-02-27 PROCEDURE — 1036F TOBACCO NON-USER: CPT | Performed by: INTERNAL MEDICINE

## 2024-02-27 PROCEDURE — 99214 OFFICE O/P EST MOD 30 MIN: CPT | Performed by: INTERNAL MEDICINE

## 2024-02-27 PROCEDURE — 80053 COMPREHEN METABOLIC PANEL: CPT

## 2024-02-27 PROCEDURE — 93005 ELECTROCARDIOGRAM TRACING: CPT | Performed by: INTERNAL MEDICINE

## 2024-02-27 PROCEDURE — 3078F DIAST BP <80 MM HG: CPT | Performed by: INTERNAL MEDICINE

## 2024-02-27 PROCEDURE — 3074F SYST BP LT 130 MM HG: CPT | Performed by: INTERNAL MEDICINE

## 2024-02-27 PROCEDURE — 93010 ELECTROCARDIOGRAM REPORT: CPT | Performed by: INTERNAL MEDICINE

## 2024-02-27 PROCEDURE — 83880 ASSAY OF NATRIURETIC PEPTIDE: CPT

## 2024-02-27 PROCEDURE — 71046 X-RAY EXAM CHEST 2 VIEWS: CPT | Performed by: RADIOLOGY

## 2024-02-27 PROCEDURE — 1159F MED LIST DOCD IN RCRD: CPT | Performed by: INTERNAL MEDICINE

## 2024-02-27 PROCEDURE — 1160F RVW MEDS BY RX/DR IN RCRD: CPT | Performed by: INTERNAL MEDICINE

## 2024-02-27 ASSESSMENT — ENCOUNTER SYMPTOMS
OCCASIONAL FEELINGS OF UNSTEADINESS: 1
DEPRESSION: 0
MYALGIAS: 0
DIARRHEA: 0
VOMITING: 0
CHILLS: 0
ALTERED MENTAL STATUS: 0
HEADACHES: 0
COUGH: 0
FEVER: 0
DYSURIA: 0
MEMORY LOSS: 0
WHEEZING: 0
BLOATING: 0
NAUSEA: 0
LOSS OF SENSATION IN FEET: 0
ABDOMINAL PAIN: 0
CONSTIPATION: 0
FALLS: 0
HEMATURIA: 0
HEMOPTYSIS: 0

## 2024-02-27 ASSESSMENT — COLUMBIA-SUICIDE SEVERITY RATING SCALE - C-SSRS
6. HAVE YOU EVER DONE ANYTHING, STARTED TO DO ANYTHING, OR PREPARED TO DO ANYTHING TO END YOUR LIFE?: NO
1. IN THE PAST MONTH, HAVE YOU WISHED YOU WERE DEAD OR WISHED YOU COULD GO TO SLEEP AND NOT WAKE UP?: NO
2. HAVE YOU ACTUALLY HAD ANY THOUGHTS OF KILLING YOURSELF?: NO

## 2024-02-27 ASSESSMENT — PAIN SCALES - GENERAL: PAINLEVEL: 2

## 2024-02-27 NOTE — PROGRESS NOTES
Chief complaint:  FU  Shortness of Breath     HPI  84 yo WF w/ h/o HFpEF, HTN, TGs, hypothyroid, anxiety now here for cardiology f/u. She c/o laryngitis x 2 weeks with increased STATON.   No further amaurosis fugax in 1 year.  No further edema on Lasix.   No recent CP (prior occ varying location chest pain/needles x seconds). +occ dyspnea at rest. +ch STATON (mod exertion). No orthopnea/PND. +occ vague palps x seconds. +occ vague LH/weak feeling (she thinks heart slows down then too). No syncope. No claudication. +occ cough.   7/21 ED for new edema and ch STATON ->   ECG 8/19: SR (81), normal  ECG 9/19: SR (75), normal  ECG 10/20: SR (70), low volt  ECG 6/21: SB (59), ?LAE  ECG 12/21: SR (65), normal  ECG 3/23: SR (62), 1st deg AVB, low volt  ECG 2/24: SB (59), low volt  HM 4/11: SR, HR  (avg 73), no PVCs, very rare PACs  HM 8/21: SR, HR  (avg 76), SVT x7 (long 16b)  HM 8/22: SR, HR  (avg 69), SVT x6 (long 22b)  HM 3/23: SR, HR  (avg 61), SVT x3 (long 20b)  Echo 4/11: EF 70%, DD, mild TR, small PE  Echo 10/20: EF 65-70%, DD, valves ok  Echo 8/21: EF 65-70%, DD, valves ok, IVC nl  CCS 9/19: 47 (all LAD)  CXR 8/19: no acute abnl  CXR 7/21: no acute abnl  CXR 12/21: no acute abnl  CTA chest 6/16: no PE, mild athero of Ao, no aneurysm, no pericardial effusion, no mention of cor calc  CT chest 10/23: nl heart size, sm PE, mild AA, no aneurysm  PFT 6/16: normal  CT brain 6/16: no acute abnl  CT brain 3/23: no acute abnl  CT brain 10/23: no acute abnl  Carotid US 3/23: plaque, no HDSS  CT ab 9/20: no AAA, nl IVC  NYLA 5/23: normal  LE US 7/21: no DVT   LE US 5/23: no DVT    Review of Systems   Constitutional: Negative for chills, fever and malaise/fatigue.   HENT:  Negative for hearing loss.    Eyes:  Negative for visual disturbance.   Respiratory:  Negative for cough, hemoptysis and wheezing.    Skin:  Negative for rash.   Musculoskeletal:  Negative for falls and myalgias.   Gastrointestinal:   Negative for bloating, abdominal pain, constipation, diarrhea, dysphagia, nausea and vomiting.   Genitourinary:  Negative for dysuria and hematuria.   Neurological:  Negative for headaches.   Psychiatric/Behavioral:  Negative for altered mental status, depression and memory loss.       Social History     Tobacco Use    Smoking status: Never    Smokeless tobacco: Never   Substance Use Topics    Alcohol use: Not Currently      No pertinent FHx    Allergies   Allergen Reactions    Penicillins Anaphylaxis    Azithromycin Other    Codeine Nausea Only and Unknown    Diphenhydramine Hcl Other    Epinephrine Other and Unknown    Morphine Nausea Only and Unknown    Zinc Acetate Syncope      Current Outpatient Medications   Medication Instructions    acetaminophen (Tylenol Extra Strength) 500 mg tablet oral, Daily RT    colchicine 0.6 mg, oral, Daily    diclofenac sodium 1 % kit Daily RT    furosemide (LASIX) 40 mg, oral, Daily    levothyroxine (SYNTHROID) 75 mcg, oral, Daily    LORazepam (ATIVAN) 1 mg, oral, Daily PRN    LORazepam (ATIVAN) 0.5 mg, oral, Every 12 hours PRN    metoprolol succinate XL (TOPROL-XL) 25 mg, oral, Daily    pantoprazole (PROTONIX) 40 mg, oral, Daily    rosuvastatin (CRESTOR) 10 mg, oral, Daily    sennosides (Senokot) 8.6 mg tablet oral, Every 72 hours    verapamil ER (VERALAN PM) 120 mg, oral, Daily      Vitals:    02/27/24 0935   BP: 121/65   Pulse: 59   SpO2: 98%      Physical Exam  Constitutional:       Appearance: Normal appearance.   HENT:      Head: Normocephalic and atraumatic.      Nose: Nose normal.   Neck:      Vascular: No carotid bruit.   Cardiovascular:      Rate and Rhythm: Normal rate and regular rhythm.      Heart sounds: No murmur heard.  Pulmonary:      Effort: Pulmonary effort is normal.      Breath sounds: Normal breath sounds.   Abdominal:      Palpations: Abdomen is soft.      Tenderness: There is no abdominal tenderness.   Musculoskeletal:      Right lower leg: No edema.       Left lower leg: No edema.   Skin:     General: Skin is warm and dry.   Neurological:      General: No focal deficit present.      Mental Status: She is alert.   Psychiatric:         Mood and Affect: Mood normal.         Judgment: Judgment normal.        Results/Data  10/23 Cr 1.06, K 3.9, LFT nl, HGB 14.6, , HSTPN 11,   12/22 Cr 1.23, K 4.0, LFT nl, , HDL 34, , Chol 214, HGB 12.4, , hgba1c 5.5, TSH 1.3  12/21 Cr 1.4, K 4.7, LFT nl, HGB 12.7, , BNP 70  8/21 Cr 1.15, K 4.8, LFT nl, , HDL 44, , Chol 169  7/21 Cr 1.02, K 4.2, LFT nl, HGB 11.2, ,   3/21 , HDL 56, TG 87, Chol 182, TSH 0.07  8/19 Cr 1.11, K 3.9, LFT nl, HGB 14.6, , TSH 4.11, TPN neg  5/19 , HDL 51, , Chol 183     Assessment/Plan   84 yo WF w/ h/o HFpEF, HTN, TGs, hypothyroid, anxiety now w/ increased STATON of unclear etiology, ?assoc with laryngitis. Check BNP (with other labs she is already getting), CXR and echo.   -ringing in ears on ASA  -continue Metoprolol Succinate 25 qd  -continue Verapamil 120 qd  -continue Lasix 10 every day (she prefers not to increase due to polyuria)  -continue Rosuva 10 every day  -try to keep euthyroid  -f/u 6 months (earlier if needed)     Sanket Head MD

## 2024-03-01 LAB
ATRIAL RATE: 59 BPM
P AXIS: 65 DEGREES
P OFFSET: 182 MS
P ONSET: 135 MS
PR INTERVAL: 172 MS
Q ONSET: 221 MS
QRS COUNT: 10 BEATS
QRS DURATION: 76 MS
QT INTERVAL: 436 MS
QTC CALCULATION(BAZETT): 431 MS
QTC FREDERICIA: 433 MS
R AXIS: 42 DEGREES
T AXIS: 42 DEGREES
T OFFSET: 439 MS
VENTRICULAR RATE: 59 BPM

## 2024-03-06 DIAGNOSIS — E03.9 HYPOTHYROIDISM, UNSPECIFIED TYPE: ICD-10-CM

## 2024-03-06 DIAGNOSIS — E03.9 HYPOTHYROIDISM, UNSPECIFIED TYPE: Primary | ICD-10-CM

## 2024-03-06 RX ORDER — LEVOTHYROXINE SODIUM 75 UG/1
75 TABLET ORAL
Qty: 72 TABLET | Refills: 1 | Status: SHIPPED | OUTPATIENT
Start: 2024-03-06 | End: 2024-06-10 | Stop reason: SDUPTHER

## 2024-03-07 ENCOUNTER — TELEPHONE (OUTPATIENT)
Dept: PRIMARY CARE | Facility: CLINIC | Age: 84
End: 2024-03-07
Payer: MEDICARE

## 2024-03-07 NOTE — TELEPHONE ENCOUNTER
----- Message from Corinne Richards DO sent at 3/6/2024  6:38 PM EST -----  Report to pt she is on too much thyroid medication.  She should take 75 mcg 6 days per week.  Recheck tsh in 2 months

## 2024-04-10 ENCOUNTER — APPOINTMENT (OUTPATIENT)
Dept: DERMATOLOGY | Facility: CLINIC | Age: 84
End: 2024-04-10
Payer: MEDICARE

## 2024-05-08 ENCOUNTER — APPOINTMENT (OUTPATIENT)
Dept: DERMATOLOGY | Facility: CLINIC | Age: 84
End: 2024-05-08
Payer: MEDICARE

## 2024-06-07 DIAGNOSIS — F41.9 ANXIETY: ICD-10-CM

## 2024-06-07 RX ORDER — LORAZEPAM 0.5 MG/1
0.5 TABLET ORAL EVERY 12 HOURS PRN
Qty: 60 TABLET | Refills: 2 | OUTPATIENT
Start: 2024-06-07 | End: 2024-09-05

## 2024-06-07 RX ORDER — LORAZEPAM 1 MG/1
1 TABLET ORAL DAILY PRN
Qty: 30 TABLET | Refills: 2 | OUTPATIENT
Start: 2024-06-07 | End: 2024-09-05

## 2024-06-07 NOTE — TELEPHONE ENCOUNTER
Patient called asking for refills on her lorazepam 1mg and 0.5mg and last seen in 2/5/24 no upcoming apt and needs uds and csa

## 2024-06-10 ENCOUNTER — OFFICE VISIT (OUTPATIENT)
Dept: PRIMARY CARE | Facility: CLINIC | Age: 84
End: 2024-06-10
Payer: MEDICARE

## 2024-06-10 ENCOUNTER — LAB (OUTPATIENT)
Dept: LAB | Facility: LAB | Age: 84
End: 2024-06-10
Payer: MEDICARE

## 2024-06-10 ENCOUNTER — HOSPITAL ENCOUNTER (OUTPATIENT)
Dept: RADIOLOGY | Facility: CLINIC | Age: 84
Discharge: HOME | End: 2024-06-10
Payer: MEDICARE

## 2024-06-10 VITALS
HEART RATE: 65 BPM | SYSTOLIC BLOOD PRESSURE: 130 MMHG | BODY MASS INDEX: 21.6 KG/M2 | WEIGHT: 110 LBS | TEMPERATURE: 97.7 F | DIASTOLIC BLOOD PRESSURE: 62 MMHG | RESPIRATION RATE: 18 BRPM | OXYGEN SATURATION: 96 % | HEIGHT: 60 IN

## 2024-06-10 DIAGNOSIS — E78.2 MIXED HYPERLIPIDEMIA: ICD-10-CM

## 2024-06-10 DIAGNOSIS — N18.31 CHRONIC KIDNEY DISEASE, STAGE 3A (MULTI): ICD-10-CM

## 2024-06-10 DIAGNOSIS — I10 PRIMARY HYPERTENSION: ICD-10-CM

## 2024-06-10 DIAGNOSIS — R06.02 SOB (SHORTNESS OF BREATH) ON EXERTION: ICD-10-CM

## 2024-06-10 DIAGNOSIS — I50.30 HEART FAILURE WITH PRESERVED LEFT VENTRICULAR FUNCTION (HFPEF) (MULTI): ICD-10-CM

## 2024-06-10 DIAGNOSIS — E03.9 HYPOTHYROIDISM, UNSPECIFIED TYPE: ICD-10-CM

## 2024-06-10 DIAGNOSIS — F41.9 ANXIETY: ICD-10-CM

## 2024-06-10 DIAGNOSIS — M35.3: ICD-10-CM

## 2024-06-10 DIAGNOSIS — E78.00 HYPERCHOLESTEROLEMIA: ICD-10-CM

## 2024-06-10 DIAGNOSIS — K21.9 GASTROESOPHAGEAL REFLUX DISEASE WITHOUT ESOPHAGITIS: ICD-10-CM

## 2024-06-10 DIAGNOSIS — N18.31 CHRONIC KIDNEY DISEASE, STAGE 3A (MULTI): Primary | ICD-10-CM

## 2024-06-10 PROBLEM — D18.02: Status: RESOLVED | Noted: 2023-05-26 | Resolved: 2024-06-10

## 2024-06-10 LAB
ALBUMIN SERPL BCP-MCNC: 4.2 G/DL (ref 3.4–5)
ALP SERPL-CCNC: 128 U/L (ref 33–136)
ALT SERPL W P-5'-P-CCNC: 36 U/L (ref 7–45)
ANION GAP SERPL CALC-SCNC: 16 MMOL/L (ref 10–20)
AST SERPL W P-5'-P-CCNC: 52 U/L (ref 9–39)
BASOPHILS # BLD AUTO: 0.05 X10*3/UL (ref 0–0.1)
BASOPHILS NFR BLD AUTO: 0.5 %
BILIRUB SERPL-MCNC: 0.6 MG/DL (ref 0–1.2)
BUN SERPL-MCNC: 25 MG/DL (ref 6–23)
CALCIUM SERPL-MCNC: 9.8 MG/DL (ref 8.6–10.6)
CHLORIDE SERPL-SCNC: 106 MMOL/L (ref 98–107)
CHOLEST SERPL-MCNC: 168 MG/DL (ref 0–199)
CHOLESTEROL/HDL RATIO: 3.8
CO2 SERPL-SCNC: 24 MMOL/L (ref 21–32)
CREAT SERPL-MCNC: 0.86 MG/DL (ref 0.5–1.05)
EGFRCR SERPLBLD CKD-EPI 2021: 67 ML/MIN/1.73M*2
EOSINOPHIL # BLD AUTO: 0.22 X10*3/UL (ref 0–0.4)
EOSINOPHIL NFR BLD AUTO: 2.2 %
ERYTHROCYTE [DISTWIDTH] IN BLOOD BY AUTOMATED COUNT: 15 % (ref 11.5–14.5)
GLUCOSE SERPL-MCNC: 77 MG/DL (ref 74–99)
HCT VFR BLD AUTO: 43.8 % (ref 36–46)
HDLC SERPL-MCNC: 44.8 MG/DL
HGB BLD-MCNC: 13.9 G/DL (ref 12–16)
IMM GRANULOCYTES # BLD AUTO: 0.02 X10*3/UL (ref 0–0.5)
IMM GRANULOCYTES NFR BLD AUTO: 0.2 % (ref 0–0.9)
LDLC SERPL CALC-MCNC: 95 MG/DL
LYMPHOCYTES # BLD AUTO: 3.71 X10*3/UL (ref 0.8–3)
LYMPHOCYTES NFR BLD AUTO: 36.3 %
MCH RBC QN AUTO: 29.3 PG (ref 26–34)
MCHC RBC AUTO-ENTMCNC: 31.7 G/DL (ref 32–36)
MCV RBC AUTO: 92 FL (ref 80–100)
MONOCYTES # BLD AUTO: 0.4 X10*3/UL (ref 0.05–0.8)
MONOCYTES NFR BLD AUTO: 3.9 %
NEUTROPHILS # BLD AUTO: 5.82 X10*3/UL (ref 1.6–5.5)
NEUTROPHILS NFR BLD AUTO: 56.9 %
NON HDL CHOLESTEROL: 123 MG/DL (ref 0–149)
NRBC BLD-RTO: 0 /100 WBCS (ref 0–0)
PLATELET # BLD AUTO: 276 X10*3/UL (ref 150–450)
POTASSIUM SERPL-SCNC: 4.4 MMOL/L (ref 3.5–5.3)
PROT SERPL-MCNC: 6.6 G/DL (ref 6.4–8.2)
RBC # BLD AUTO: 4.74 X10*6/UL (ref 4–5.2)
SODIUM SERPL-SCNC: 142 MMOL/L (ref 136–145)
T4 FREE SERPL-MCNC: 1.77 NG/DL (ref 0.78–1.48)
TRIGL SERPL-MCNC: 140 MG/DL (ref 0–149)
TSH SERPL-ACNC: 0.11 MIU/L (ref 0.44–3.98)
VLDL: 28 MG/DL (ref 0–40)
WBC # BLD AUTO: 10.2 X10*3/UL (ref 4.4–11.3)

## 2024-06-10 PROCEDURE — 80061 LIPID PANEL: CPT

## 2024-06-10 PROCEDURE — 80053 COMPREHEN METABOLIC PANEL: CPT

## 2024-06-10 PROCEDURE — 84443 ASSAY THYROID STIM HORMONE: CPT

## 2024-06-10 PROCEDURE — 3075F SYST BP GE 130 - 139MM HG: CPT | Performed by: FAMILY MEDICINE

## 2024-06-10 PROCEDURE — 1159F MED LIST DOCD IN RCRD: CPT | Performed by: FAMILY MEDICINE

## 2024-06-10 PROCEDURE — 99215 OFFICE O/P EST HI 40 MIN: CPT | Performed by: FAMILY MEDICINE

## 2024-06-10 PROCEDURE — 3078F DIAST BP <80 MM HG: CPT | Performed by: FAMILY MEDICINE

## 2024-06-10 PROCEDURE — 85025 COMPLETE CBC W/AUTO DIFF WBC: CPT

## 2024-06-10 PROCEDURE — 1125F AMNT PAIN NOTED PAIN PRSNT: CPT | Performed by: FAMILY MEDICINE

## 2024-06-10 PROCEDURE — 1036F TOBACCO NON-USER: CPT | Performed by: FAMILY MEDICINE

## 2024-06-10 PROCEDURE — 84439 ASSAY OF FREE THYROXINE: CPT

## 2024-06-10 PROCEDURE — 1123F ACP DISCUSS/DSCN MKR DOCD: CPT | Performed by: FAMILY MEDICINE

## 2024-06-10 PROCEDURE — 71046 X-RAY EXAM CHEST 2 VIEWS: CPT | Performed by: RADIOLOGY

## 2024-06-10 PROCEDURE — 71046 X-RAY EXAM CHEST 2 VIEWS: CPT

## 2024-06-10 PROCEDURE — 36415 COLL VENOUS BLD VENIPUNCTURE: CPT

## 2024-06-10 RX ORDER — LEVOTHYROXINE SODIUM 75 UG/1
75 TABLET ORAL
Qty: 72 TABLET | Refills: 1 | Status: SHIPPED | OUTPATIENT
Start: 2024-06-10

## 2024-06-10 RX ORDER — ROSUVASTATIN CALCIUM 10 MG/1
10 TABLET, COATED ORAL DAILY
Qty: 90 TABLET | Refills: 3 | Status: SHIPPED | OUTPATIENT
Start: 2024-06-10

## 2024-06-10 RX ORDER — VERAPAMIL HYDROCHLORIDE 120 MG/1
120 CAPSULE, EXTENDED RELEASE ORAL DAILY
Qty: 90 CAPSULE | Refills: 3 | Status: SHIPPED | OUTPATIENT
Start: 2024-06-10

## 2024-06-10 RX ORDER — LORAZEPAM 0.5 MG/1
0.5 TABLET ORAL EVERY 12 HOURS PRN
Qty: 60 TABLET | Refills: 2 | Status: SHIPPED | OUTPATIENT
Start: 2024-06-10 | End: 2024-09-08

## 2024-06-10 RX ORDER — LORAZEPAM 1 MG/1
1 TABLET ORAL DAILY PRN
Qty: 30 TABLET | Refills: 2 | Status: SHIPPED | OUTPATIENT
Start: 2024-06-10 | End: 2024-09-08

## 2024-06-10 RX ORDER — METOPROLOL SUCCINATE 25 MG/1
25 TABLET, EXTENDED RELEASE ORAL DAILY
Qty: 90 TABLET | Refills: 3 | Status: SHIPPED | OUTPATIENT
Start: 2024-06-10

## 2024-06-10 RX ORDER — FUROSEMIDE 20 MG/1
40 TABLET ORAL DAILY
Qty: 90 TABLET | Refills: 1 | Status: SHIPPED | OUTPATIENT
Start: 2024-06-10

## 2024-06-10 RX ORDER — PANTOPRAZOLE SODIUM 40 MG/1
40 TABLET, DELAYED RELEASE ORAL DAILY
Qty: 90 TABLET | Refills: 1 | Status: SHIPPED | OUTPATIENT
Start: 2024-06-10 | End: 2024-12-07

## 2024-06-10 ASSESSMENT — PAIN SCALES - GENERAL: PAINLEVEL: 8

## 2024-06-10 ASSESSMENT — PATIENT HEALTH QUESTIONNAIRE - PHQ9
1. LITTLE INTEREST OR PLEASURE IN DOING THINGS: NOT AT ALL
2. FEELING DOWN, DEPRESSED OR HOPELESS: NOT AT ALL
SUM OF ALL RESPONSES TO PHQ9 QUESTIONS 1 AND 2: 0

## 2024-06-10 NOTE — PROGRESS NOTES
Subjective   Patient ID: Lisa Hassan is a 84 y.o. female who presents for med refills.    HPI   The patient presents to the clinic for medication refills. She has past medical history of hypertension, hyperlipidemia, heart failure, heart palpitations, hypothyroidism, GERD, STATON, allergic rhinitis, IBS, CKD (stage 3a), anxiety, osteoarthritis, chronic headaches, anarthritic rheumatoid disease, lumbar spinal stenosis, and reactive airway disease.    She has been compliant with medication and she denies any side-effects to current medication.    The patient inquires if her throat and right ear can be examined as she has been experiencing pain/irritation in these locations recently.  Was sick about 3 weeks ago and ear on r mildly tender when scratches w finger and thrt has been a little scratchy and feels PND.  Hx of allergies years ago but no longer per pt    The patient wants to go back on Celebrex medication. She states that she has been experiencing difficulty with ambulation without Celebrex medication. Notably, her Celebrex medication was discontinued previously secondary to abnormal kidney function (CKD stage 3A).  Last labs Feb, pt wants checked again    The patient is concerned about her history of heart failure. She is no longer consulting with her cardiologist regularly, seeing yearly now. Meds controlling, no swelling etc, no owrsening sob.  She and daughter want her BNP repeated.  Last was in Feb. And was normal    She reports difficulty with sleep  secondary to noisy home conditions, she continues living in daughters basement and she states kids are always stomping around and it makes a lot of noise and bothers her alot. She is searching for a new apartment to move out but they all have been expensive.  .    Review of Systems    Objective   /62   Pulse 65   Temp 36.5 °C (97.7 °F)   Resp 18   Ht 1.524 m (5')   Wt 49.9 kg (110 lb)   SpO2 96%   BMI 21.48 kg/m²     Physical Exam  Neck:       Vascular: No carotid bruit.   Cardiovascular:      Rate and Rhythm: Normal rate and regular rhythm.      Pulses: Normal pulses.      Heart sounds: Normal heart sounds.   Pulmonary:      Effort: Pulmonary effort is normal.      Breath sounds: Normal breath sounds.   Musculoskeletal:         General: Tenderness and deformity present.      Cervical back: Normal range of motion.      Right lower leg: No edema.      Left lower leg: No edema.   Skin:     General: Skin is warm and dry.   Neurological:      Mental Status: She is alert and oriented to person, place, and time.      Motor: Weakness present.      Gait: Gait abnormal.   Psychiatric:         Mood and Affect: Mood normal.         Thought Content: Thought content normal.         Judgment: Judgment normal.         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  Off nsaid due to CKD, having difficulty controlling pain w Tylenol         Chronic kidney disease, stage 3a (Multi) - Primary N18.31     Stable, rechecking today.  BS and htn good control         Relevant Orders    Comprehensive Metabolic Panel    Gastroesophageal reflux disease K21.9    Relevant Medications    pantoprazole (ProtoNix) 40 mg EC tablet    Heart failure with preserved left ventricular function (HFpEF) (Multi) I50.30    Relevant Medications    verapamil ER (Veralan PM) 120 mg 24 hr capsule    metoprolol succinate XL (Toprol-XL) 25 mg 24 hr tablet    Hypothyroidism E03.9    Relevant Medications    levothyroxine (Synthroid) 75 mcg tablet    Other Relevant Orders    TSH with reflex to Free T4 if abnormal    Hypertension I10    Relevant Medications    verapamil ER (Veralan PM) 120 mg 24 hr capsule    metoprolol succinate XL (Toprol-XL) 25 mg 24 hr tablet    furosemide (Lasix) 20 mg tablet    Other Relevant Orders    CBC and Auto Differential    Hypercholesterolemia E78.00    Relevant  Medications    rosuvastatin (Crestor) 10 mg tablet    Mixed hyperlipidemia E78.2    Relevant Orders    Lipid Panel     Other Visit Diagnoses         Codes    SOB (shortness of breath) on exertion     R06.02    Relevant Orders    XR chest 2 views    Anxiety     F41.9    Relevant Medications    LORazepam (Ativan) 1 mg tablet    LORazepam (Ativan) 0.5 mg tablet               Labs (CBC, CMP, lipid panel, TSH) were ordered for the patient. She intends to complete her labs soon. The clinic will contact the patient upon receiving her lab results.     The patient received refills for current medication. She was instructed to continue taking medication as previous directed.    In regards to her request to go back on Celebrex medication, the patient was advised that she may not take this class of meds due to her kidney function.  Will recheck today w her labs, but CR/GFR have been abnormal    In regards to concerns with her history of heart failure, a chest x-ray was ordered for the patient to further evaluate this condition in accordance with the patient's request. The clinic will contact the patient upon receiving her imaging results. Continue monitoring symptoms for improvement/exacerbation.  Also drawing BNP, she is stable w breathing and no leg edema etc.  Pt admits has been very inactive due to her joint pain, etc.  Whe mora snot want to go back to rheumatology    In regards to her request to examine her right ear and throat, the patient was advised that her right ear is slightly has excess cerumen and L is clear.  . She may benefit from use of OTC Debrox ear drops. In terms of her throat, she was informed that she is suffering from very mild post-nasal drainage but no redness , rec steroid ns such as flonase sensimist for this. Continue monitoring symptoms for improvement/exacerbation.    Refilled ativan, same dose .  Cautioned about increased falls and sleepiness w this med, she has been on for many years.  Checked  OARRs and no suspicious activity. She will follow up in 3 months or prn sooner.  UDS/CSA utd    Scribe Attestation  By signing my name below, I, Remigio Hightower , Scribe   attest that this documentation has been prepared under the direction and in the presence of Corinne Richards DO.

## 2024-06-10 NOTE — ASSESSMENT & PLAN NOTE
No longer seeing rheumatologist.  Off nsaid due to CKD, having difficulty controlling pain w Tylenol

## 2024-06-12 ENCOUNTER — TELEPHONE (OUTPATIENT)
Dept: PRIMARY CARE | Facility: CLINIC | Age: 84
End: 2024-06-12
Payer: MEDICARE

## 2024-06-12 NOTE — TELEPHONE ENCOUNTER
LVM FOR PATIENT WITH HER XRAY RESULTS   Problem: Postpartum ( Delivery) (Adult,Obstetrics,Pediatric)  Goal: Signs and Symptoms of Listed Potential Problems Will be Absent, Minimized or Managed (Postpartum)  Signs and symptoms of listed potential problems will be absent, minimized or managed by discharge/transition of care (reference Postpartum ( Delivery) (Adult,Obstetrics,Pediatric) CPG).   Outcome: Improving  Data: Vital signs within normal limits. Postpartum checks within normal limits - see flow record. Patient eating and drinking normally. Patient able to empty bladder independently and is up ambulating. No apparent signs of infection. Incisional dressing on, intact, clean, dry.  Patient performing self cares and is able to care for infant.  Action: Patient medicated during the shift with scheduled medications. See MAR. Patient reassessed within 1 hour after each medication and pain was improved - patient stated she was comfortable. Patient education done about breastfeeding, positioning and latch .See flow record.  Response: Positive attachment behaviors observed with infant. Support person  present.   Plan: continue with plan of care.

## 2024-06-12 NOTE — TELEPHONE ENCOUNTER
----- Message from Corinne Richards DO sent at 6/12/2024 11:27 AM EDT -----  Report to pt her chest xray is wnl

## 2024-06-13 ENCOUNTER — TELEPHONE (OUTPATIENT)
Dept: PRIMARY CARE | Facility: CLINIC | Age: 84
End: 2024-06-13
Payer: MEDICARE

## 2024-06-13 NOTE — TELEPHONE ENCOUNTER
----- Message from Corinne Richards DO sent at 6/12/2024  9:06 PM EDT -----  Pt's blood count is wnl.  Her chemistries are normal except mild elevation of one liver enzyme.  Her cholesterol is in good range.  Her TSH for thyroid is abnormal, ask her if she is taking 6 days per week as prescribed and skipping Sunday?

## 2024-06-18 NOTE — TELEPHONE ENCOUNTER
REACHED OUT FOR THE THIRD TIME AND IM NOT GETTING A CALL BACK LVM EACH TIME I CALLED ASKING FOR HER TO CALL ME BACK.

## 2024-06-21 ENCOUNTER — TELEPHONE (OUTPATIENT)
Dept: PRIMARY CARE | Facility: CLINIC | Age: 84
End: 2024-06-21
Payer: MEDICARE

## 2024-06-21 NOTE — TELEPHONE ENCOUNTER
PATIENT CALLED AND LEFT A MESSAGE CRYING SHE IS IN SO MUCH PAIN WITH HER LEG SHE SAID THE LEVEL IS AT 20 IT TOOK HER OVER 4 HOURS TO GET TO THE BATHROOM BECAUSE SHE CAN'T PUT ANY PRESSURE ON HER LEG AND SHE IS BEGGING FOR MELOXICAM . I CALLED HER DAUGHTER [place] AND ADVISED IF THE PAIN IS THAT BAD SHE NEEDS TO GO TO THE ER. SHE SAID THE LAST TWO LABS WERE GOOD SO SHE DOESN'T UNDERSTAND WHY YOU CAN'T GIVE HER HER MEDS PLEASE ADVISE.

## 2024-07-07 DIAGNOSIS — E03.9 HYPOTHYROIDISM, UNSPECIFIED TYPE: ICD-10-CM

## 2024-07-18 RX ORDER — LEVOTHYROXINE SODIUM 75 UG/1
75 TABLET ORAL
Qty: 90 TABLET | Refills: 1 | Status: SHIPPED | OUTPATIENT
Start: 2024-07-18

## 2024-07-21 DIAGNOSIS — M25.569 PAIN IN UNSPECIFIED KNEE: ICD-10-CM

## 2024-08-02 RX ORDER — DICLOFENAC SODIUM 10 MG/G
GEL TOPICAL
Qty: 200 G | Refills: 5 | Status: SHIPPED | OUTPATIENT
Start: 2024-08-02

## 2024-08-20 ENCOUNTER — APPOINTMENT (OUTPATIENT)
Dept: DERMATOLOGY | Facility: CLINIC | Age: 84
End: 2024-08-20
Payer: MEDICARE

## 2024-09-30 DIAGNOSIS — E03.9 HYPOTHYROIDISM, UNSPECIFIED TYPE: ICD-10-CM

## 2024-10-09 DIAGNOSIS — E03.9 HYPOTHYROIDISM, UNSPECIFIED TYPE: ICD-10-CM

## 2024-11-08 DIAGNOSIS — I10 PRIMARY HYPERTENSION: ICD-10-CM

## 2024-11-08 DIAGNOSIS — E03.9 HYPOTHYROIDISM, UNSPECIFIED TYPE: ICD-10-CM

## 2024-11-08 DIAGNOSIS — K21.9 GASTROESOPHAGEAL REFLUX DISEASE WITHOUT ESOPHAGITIS: ICD-10-CM

## 2024-11-08 DIAGNOSIS — E78.00 HYPERCHOLESTEROLEMIA: ICD-10-CM

## 2024-11-08 DIAGNOSIS — F41.9 ANXIETY: ICD-10-CM

## 2024-11-08 RX ORDER — PANTOPRAZOLE SODIUM 40 MG/1
40 TABLET, DELAYED RELEASE ORAL DAILY
Qty: 30 TABLET | Refills: 0 | Status: SHIPPED | OUTPATIENT
Start: 2024-11-08 | End: 2024-12-08

## 2024-11-08 RX ORDER — ROSUVASTATIN CALCIUM 10 MG/1
10 TABLET, COATED ORAL DAILY
Qty: 30 TABLET | Refills: 0 | Status: SHIPPED | OUTPATIENT
Start: 2024-11-08 | End: 2024-12-08

## 2024-11-08 RX ORDER — FUROSEMIDE 20 MG/1
40 TABLET ORAL DAILY
Qty: 60 TABLET | Refills: 0 | Status: SHIPPED | OUTPATIENT
Start: 2024-11-08 | End: 2024-12-08

## 2024-11-08 RX ORDER — LORAZEPAM 0.5 MG/1
0.5 TABLET ORAL EVERY 12 HOURS PRN
Qty: 60 TABLET | Refills: 0 | OUTPATIENT
Start: 2024-11-08 | End: 2025-02-06

## 2024-11-08 RX ORDER — LORAZEPAM 1 MG/1
1 TABLET ORAL DAILY PRN
Qty: 30 TABLET | Refills: 0 | OUTPATIENT
Start: 2024-11-08 | End: 2025-02-06

## 2024-11-08 RX ORDER — LEVOTHYROXINE SODIUM 75 UG/1
75 TABLET ORAL
Qty: 24 TABLET | Refills: 0 | Status: SHIPPED | OUTPATIENT
Start: 2024-11-08 | End: 2024-12-08

## 2024-11-08 RX ORDER — VERAPAMIL HYDROCHLORIDE 120 MG/1
120 CAPSULE, EXTENDED RELEASE ORAL DAILY
Qty: 30 CAPSULE | Refills: 0 | Status: SHIPPED | OUTPATIENT
Start: 2024-11-08 | End: 2024-12-08

## 2024-11-08 RX ORDER — METOPROLOL SUCCINATE 25 MG/1
25 TABLET, EXTENDED RELEASE ORAL DAILY
Qty: 30 TABLET | Refills: 0 | Status: SHIPPED | OUTPATIENT
Start: 2024-11-08 | End: 2024-12-08

## 2024-11-08 NOTE — TELEPHONE ENCOUNTER
Patient is out of meds needs refills   Last seen 6/10/24  No upcoming apt she is aware she needs to make an apt and she is asking for her other meds as well. Please advise.

## 2024-11-11 DIAGNOSIS — Z79.899 LONG TERM USE OF DRUG: ICD-10-CM

## 2024-11-11 DIAGNOSIS — Z79.899 MEDICATION MANAGEMENT: ICD-10-CM

## 2024-11-12 ENCOUNTER — APPOINTMENT (OUTPATIENT)
Dept: PRIMARY CARE | Facility: CLINIC | Age: 84
End: 2024-11-12
Payer: MEDICARE

## 2024-11-15 ENCOUNTER — LAB (OUTPATIENT)
Dept: LAB | Facility: LAB | Age: 84
End: 2024-11-15
Payer: MEDICARE

## 2024-11-15 ENCOUNTER — OFFICE VISIT (OUTPATIENT)
Dept: CARDIOLOGY | Facility: CLINIC | Age: 84
End: 2024-11-15
Payer: MEDICARE

## 2024-11-15 VITALS
SYSTOLIC BLOOD PRESSURE: 120 MMHG | HEART RATE: 58 BPM | HEIGHT: 60 IN | OXYGEN SATURATION: 96 % | BODY MASS INDEX: 21.6 KG/M2 | DIASTOLIC BLOOD PRESSURE: 70 MMHG | WEIGHT: 110 LBS

## 2024-11-15 DIAGNOSIS — R06.09 DOE (DYSPNEA ON EXERTION): Primary | ICD-10-CM

## 2024-11-15 DIAGNOSIS — R06.09 DOE (DYSPNEA ON EXERTION): ICD-10-CM

## 2024-11-15 LAB
ALBUMIN SERPL BCP-MCNC: 4.3 G/DL (ref 3.4–5)
ALP SERPL-CCNC: 134 U/L (ref 33–136)
ALT SERPL W P-5'-P-CCNC: 27 U/L (ref 7–45)
ANION GAP SERPL CALC-SCNC: 15 MMOL/L (ref 10–20)
AST SERPL W P-5'-P-CCNC: 51 U/L (ref 9–39)
BILIRUB SERPL-MCNC: 0.7 MG/DL (ref 0–1.2)
BNP SERPL-MCNC: 180 PG/ML (ref 0–99)
BUN SERPL-MCNC: 19 MG/DL (ref 6–23)
CALCIUM SERPL-MCNC: 9.5 MG/DL (ref 8.6–10.6)
CHLORIDE SERPL-SCNC: 106 MMOL/L (ref 98–107)
CO2 SERPL-SCNC: 25 MMOL/L (ref 21–32)
CREAT SERPL-MCNC: 1.09 MG/DL (ref 0.5–1.05)
EGFRCR SERPLBLD CKD-EPI 2021: 50 ML/MIN/1.73M*2
GLUCOSE SERPL-MCNC: 92 MG/DL (ref 74–99)
MAGNESIUM SERPL-MCNC: 2.02 MG/DL (ref 1.6–2.4)
POTASSIUM SERPL-SCNC: 4.9 MMOL/L (ref 3.5–5.3)
PROT SERPL-MCNC: 6.6 G/DL (ref 6.4–8.2)
SODIUM SERPL-SCNC: 141 MMOL/L (ref 136–145)

## 2024-11-15 PROCEDURE — 1036F TOBACCO NON-USER: CPT | Performed by: INTERNAL MEDICINE

## 2024-11-15 PROCEDURE — 1126F AMNT PAIN NOTED NONE PRSNT: CPT | Performed by: INTERNAL MEDICINE

## 2024-11-15 PROCEDURE — 85027 COMPLETE CBC AUTOMATED: CPT

## 2024-11-15 PROCEDURE — 1123F ACP DISCUSS/DSCN MKR DOCD: CPT | Performed by: INTERNAL MEDICINE

## 2024-11-15 PROCEDURE — 36415 COLL VENOUS BLD VENIPUNCTURE: CPT

## 2024-11-15 PROCEDURE — 83880 ASSAY OF NATRIURETIC PEPTIDE: CPT

## 2024-11-15 PROCEDURE — 99214 OFFICE O/P EST MOD 30 MIN: CPT | Performed by: INTERNAL MEDICINE

## 2024-11-15 PROCEDURE — 84443 ASSAY THYROID STIM HORMONE: CPT

## 2024-11-15 PROCEDURE — 1159F MED LIST DOCD IN RCRD: CPT | Performed by: INTERNAL MEDICINE

## 2024-11-15 PROCEDURE — 3078F DIAST BP <80 MM HG: CPT | Performed by: INTERNAL MEDICINE

## 2024-11-15 PROCEDURE — 3074F SYST BP LT 130 MM HG: CPT | Performed by: INTERNAL MEDICINE

## 2024-11-15 PROCEDURE — 84439 ASSAY OF FREE THYROXINE: CPT

## 2024-11-15 PROCEDURE — 83735 ASSAY OF MAGNESIUM: CPT

## 2024-11-15 PROCEDURE — 80053 COMPREHEN METABOLIC PANEL: CPT

## 2024-11-15 RX ORDER — LEVOTHYROXINE SODIUM 75 UG/1
TABLET ORAL
Qty: 72 TABLET | Refills: 1 | OUTPATIENT
Start: 2024-11-15

## 2024-11-15 ASSESSMENT — ENCOUNTER SYMPTOMS
WHEEZING: 0
NAUSEA: 0
MYALGIAS: 0
FALLS: 0
FEVER: 0
ALTERED MENTAL STATUS: 0
OCCASIONAL FEELINGS OF UNSTEADINESS: 1
CHILLS: 0
BLOATING: 0
ABDOMINAL PAIN: 0
HEMATURIA: 0
DEPRESSION: 0
PALPITATIONS: 1
COUGH: 0
LOSS OF SENSATION IN FEET: 0
CONSTIPATION: 0
DYSURIA: 0
VOMITING: 0
DIARRHEA: 0
MEMORY LOSS: 0
HEADACHES: 0
HEMOPTYSIS: 0

## 2024-11-15 ASSESSMENT — PAIN SCALES - GENERAL: PAINLEVEL_OUTOF10: 0-NO PAIN

## 2024-11-15 NOTE — PROGRESS NOTES
Chief Complaint   Patient presents with    Follow-up    Heart Failure       HPI  83 yo WF w/ h/o HFpEF, HTN, TGs, hypothyroid, anxiety now here for cardiology f/u. She c/o increase STATON. She stopped Lasix due to polyuria/urgency.  No further  CP (prior occ varying location chest pain/needles x seconds). +occ dyspnea at rest. +ch STATON (mod exertion). No orthopnea/PND. +occ vague palps x seconds. +occ vague LH/weak feeling (she thinks heart slows down then too). No syncope. +occ mild LE edema. No claudication. +occ cough. No further amaurosis fugax.  7/21 ED for new edema and ch STATON ->   ECG 8/19: SR (81), normal  ECG 9/19: SR (75), normal  ECG 10/20: SR (70), low volt  ECG 6/21: SB (59), ?LAE  ECG 12/21: SR (65), normal  ECG 3/23: SR (62), 1st deg AVB, low volt  ECG 2/24: SB (59), low volt  HM 4/11: SR, HR  (avg 73), no PVCs, very rare PACs  HM 8/21: SR, HR  (avg 76), SVT x7 (long 16b)  HM 8/22: SR, HR  (avg 69), SVT x6 (long 22b)  HM 3/23: SR, HR  (avg 61), SVT x3 (long 20b)  Echo 4/11: EF 70%, DD, mild TR, small PE  Echo 10/20: EF 65-70%, DD, valves ok  Echo 8/21: EF 65-70%, DD, valves ok, IVC nl  CCS 9/19: 47 (all LAD)  CXR 8/19: no acute abnl  CXR 7/21: no acute abnl  CXR 12/21: no acute abnl  CXR 6/24: no acute abnl, hyperinflated  CTA chest 6/16: no PE, mild athero of Ao, no aneurysm, no pericardial effusion, no mention of cor calc  CT chest 10/23: nl heart size, sm PE, mild AA, no aneurysm  PFT 6/16: normal  CT brain 6/16: no acute abnl  CT brain 3/23: no acute abnl  CT brain 10/23: no acute abnl  Carotid US 3/23: plaque, no HDSS  CT ab 9/20: no AAA, nl IVC  NYLA 5/23: normal  LE US 7/21: no DVT   LE US 5/23: no DVT    Review of Systems   Constitutional: Negative for chills, fever and malaise/fatigue.   HENT:  Negative for hearing loss.    Eyes:  Negative for visual disturbance.   Cardiovascular:  Positive for palpitations.   Respiratory:  Negative for cough, hemoptysis and  wheezing.    Skin:  Negative for rash.   Musculoskeletal:  Negative for falls and myalgias.   Gastrointestinal:  Negative for bloating, abdominal pain, constipation, diarrhea, dysphagia, nausea and vomiting.   Genitourinary:  Negative for dysuria and hematuria.   Neurological:  Negative for headaches.   Psychiatric/Behavioral:  Negative for altered mental status, depression and memory loss.       Social History     Tobacco Use    Smoking status: Never    Smokeless tobacco: Never   Substance Use Topics    Alcohol use: Not Currently      No pertinent FHx    Allergies   Allergen Reactions    Penicillins Anaphylaxis    Azithromycin Other    Codeine Nausea Only and Unknown    Diphenhydramine Hcl Other    Epinephrine Other and Unknown    Morphine Nausea Only, Unknown and Nausea/vomiting    Zinc Acetate Syncope    Diphenhydramine-Zinc Acetate Palpitations      Current Outpatient Medications   Medication Instructions    acetaminophen (Tylenol Extra Strength) 500 mg tablet Daily RT    colchicine 0.6 mg, oral, Daily    diclofenac sodium (Voltaren) 1 % gel APPLY TO LOWER EXTREMITIES, 4 GM OF GEL TO AFFECTED AREA 4 TIMES DAILY. DO NOT APPLY MORE THAN 16 GM DAILY TO ANY ONE AFFECTED JOINT.    diclofenac sodium 1 % kit Daily RT    furosemide (LASIX) 40 mg, oral, Daily    levothyroxine (SYNTHROID, LEVOXYL) 75 mcg, oral, Every Mon/Tues/Wed/Thur/Fri/Sat, 6 days per week    LORazepam (ATIVAN) 1 mg, oral, Daily PRN    LORazepam (ATIVAN) 0.5 mg, oral, Every 12 hours PRN    metoprolol succinate XL (TOPROL-XL) 25 mg, oral, Daily    pantoprazole (PROTONIX) 40 mg, oral, Daily    rosuvastatin (CRESTOR) 10 mg, oral, Daily    sennosides (Senokot) 8.6 mg tablet Every 72 hours    verapamil ER (VERALAN PM) 120 mg, oral, Daily      /70   Pulse 58   Ht 1.524 m (5')   Wt 49.9 kg (110 lb)   SpO2 96%   BMI 21.48 kg/m²       Physical Exam  Constitutional:       Appearance: Normal appearance.   HENT:      Head: Normocephalic and atraumatic.       Nose: Nose normal.   Neck:      Vascular: No carotid bruit.   Cardiovascular:      Rate and Rhythm: Normal rate and regular rhythm.      Heart sounds: No murmur heard.  Pulmonary:      Effort: Pulmonary effort is normal.      Breath sounds: Normal breath sounds.   Abdominal:      Palpations: Abdomen is soft.      Tenderness: There is no abdominal tenderness.   Musculoskeletal:      Right lower leg: No edema.      Left lower leg: No edema.   Skin:     General: Skin is warm and dry.   Neurological:      General: No focal deficit present.      Mental Status: She is alert.   Psychiatric:         Mood and Affect: Mood normal.         Judgment: Judgment normal.        Results/Data  6/24 Cr 0.86, K 4.4, LDL 95, HDL 45, , Chol 168, HGB 13.9, , TSH 0.11, FT4 1.77  2/24 Mg 1.81, LDL 54, HDL 36, , Chol 120, BNP 93  10/23 Cr 1.06, K 3.9, LFT nl, HGB 14.6, , HSTPN 11,   12/22 Cr 1.23, K 4.0, LFT nl, , HDL 34, , Chol 214, HGB 12.4, , hgba1c 5.5, TSH 1.3  12/21 Cr 1.4, K 4.7, LFT nl, HGB 12.7, , BNP 70  8/21 Cr 1.15, K 4.8, LFT nl, , HDL 44, , Chol 169  7/21 Cr 1.02, K 4.2, LFT nl, HGB 11.2, ,   3/21 , HDL 56, TG 87, Chol 182, TSH 0.07  8/19 Cr 1.11, K 3.9, LFT nl, HGB 14.6, , TSH 4.11, TPN neg  5/19 , HDL 51, , Chol 183     Assessment/Plan   83 yo WF w/ h/o HFpEF, HTN, TGs, hypothyroid, anxiety now w/ increased STATON of unclear etiology, ?mild CHF off Lasix. PE okay today. Check echo and BNP. She prefers to hold off on diuretic unless tests indicate need; and if so, she prefers milder diuretic than loop - so will try Dank.  -ringing in ears on ASA  -continue Metoprolol Succinate 25 qd  -continue Verapamil 120 qd  -continue Rosuva 10 every day  -try to keep euthyroid  -f/u 6 months (earlier if needed)     Sanket Head MD

## 2024-11-16 LAB
ERYTHROCYTE [DISTWIDTH] IN BLOOD BY AUTOMATED COUNT: 14.3 % (ref 11.5–14.5)
HCT VFR BLD AUTO: 41.7 % (ref 36–46)
HGB BLD-MCNC: 13.2 G/DL (ref 12–16)
MCH RBC QN AUTO: 28.9 PG (ref 26–34)
MCHC RBC AUTO-ENTMCNC: 31.7 G/DL (ref 32–36)
MCV RBC AUTO: 91 FL (ref 80–100)
NRBC BLD-RTO: 0 /100 WBCS (ref 0–0)
PLATELET # BLD AUTO: 193 X10*3/UL (ref 150–450)
RBC # BLD AUTO: 4.56 X10*6/UL (ref 4–5.2)
T4 FREE SERPL-MCNC: 1.88 NG/DL (ref 0.78–1.48)
TSH SERPL-ACNC: 0.06 MIU/L (ref 0.44–3.98)
WBC # BLD AUTO: 8.6 X10*3/UL (ref 4.4–11.3)

## 2024-11-20 ENCOUNTER — APPOINTMENT (OUTPATIENT)
Dept: PRIMARY CARE | Facility: CLINIC | Age: 84
End: 2024-11-20
Payer: MEDICARE

## 2024-11-20 DIAGNOSIS — I50.30 HEART FAILURE WITH PRESERVED LEFT VENTRICULAR FUNCTION (HFPEF): Primary | ICD-10-CM

## 2024-11-20 RX ORDER — SPIRONOLACTONE 25 MG/1
25 TABLET ORAL DAILY
Qty: 90 TABLET | Refills: 1 | Status: SHIPPED | OUTPATIENT
Start: 2024-11-20 | End: 2025-11-20

## 2024-11-21 ENCOUNTER — TELEMEDICINE (OUTPATIENT)
Dept: PRIMARY CARE | Facility: CLINIC | Age: 84
End: 2024-11-21
Payer: MEDICARE

## 2024-11-21 DIAGNOSIS — I50.30 HEART FAILURE WITH PRESERVED LEFT VENTRICULAR FUNCTION (HFPEF): ICD-10-CM

## 2024-11-21 DIAGNOSIS — E11.65 TYPE 2 DIABETES MELLITUS WITH HYPERGLYCEMIA, UNSPECIFIED WHETHER LONG TERM INSULIN USE (MULTI): ICD-10-CM

## 2024-11-21 DIAGNOSIS — K21.9 GASTROESOPHAGEAL REFLUX DISEASE WITHOUT ESOPHAGITIS: ICD-10-CM

## 2024-11-21 DIAGNOSIS — E78.5 HYPERLIPIDEMIA, UNSPECIFIED HYPERLIPIDEMIA TYPE: ICD-10-CM

## 2024-11-21 DIAGNOSIS — N18.31 CHRONIC KIDNEY DISEASE, STAGE 3A (MULTI): ICD-10-CM

## 2024-11-21 DIAGNOSIS — R73.9 HYPERGLYCEMIA: ICD-10-CM

## 2024-11-21 DIAGNOSIS — F41.1 GENERALIZED ANXIETY DISORDER: ICD-10-CM

## 2024-11-21 DIAGNOSIS — E03.9 HYPOTHYROIDISM, UNSPECIFIED TYPE: Primary | ICD-10-CM

## 2024-11-21 DIAGNOSIS — E78.2 MIXED HYPERLIPIDEMIA: ICD-10-CM

## 2024-11-21 PROBLEM — E05.90 HYPERTHYROIDISM: Status: RESOLVED | Noted: 2023-06-23 | Resolved: 2024-11-21

## 2024-11-21 PROCEDURE — G2211 COMPLEX E/M VISIT ADD ON: HCPCS | Performed by: FAMILY MEDICINE

## 2024-11-21 PROCEDURE — 1123F ACP DISCUSS/DSCN MKR DOCD: CPT | Performed by: FAMILY MEDICINE

## 2024-11-21 PROCEDURE — 99214 OFFICE O/P EST MOD 30 MIN: CPT | Performed by: FAMILY MEDICINE

## 2024-11-21 PROCEDURE — 1159F MED LIST DOCD IN RCRD: CPT | Performed by: FAMILY MEDICINE

## 2024-11-21 PROCEDURE — 1160F RVW MEDS BY RX/DR IN RCRD: CPT | Performed by: FAMILY MEDICINE

## 2024-11-21 PROCEDURE — 1036F TOBACCO NON-USER: CPT | Performed by: FAMILY MEDICINE

## 2024-11-21 NOTE — PROGRESS NOTES
Subjective   Patient ID: Lisa Hassan is a 84 y.o. female who presents for to go over labs.    HPI   The patient presents to the clinic, via virtual appointment, to review labs . She has past medical history of hypertension, hyperlipidemia, heart failure, heart palpitations, hypothyroidism, GERD, STATON, allergic rhinitis, IBS, CKD (stage 3a), anxiety, osteoarthritis, chronic headaches, anarthritic rheumatoid disease, lumbar spinal stenosis, and reactive airway disease.     The patient had labs done on 11/15/2024 (ordered by Dr. Head in cardiology) and requests to have them reviewed.  BP mildly elevated only.  Daughter was worried about anemia, she is not anemic on labs.  TSH is suppressed, will decrease levothyroxine to 6 d per week    The patient reports that she was recently started on spironolactone 25 mg medication for treatment of heart failure under the supervision of her cardiologist Dr. Head (BNP was elevated at 180 when checked on labs done on 11/15/2024). She has not picked up the spironolactone yet and not started, not taking lasix because can not make it to the bathroom when she takes it.  Notably, she continues on metoprolol succinate XL 25 mg daily and verapamil  mg daily for treatment of hypertension. She has not been taking her Lasix medication for treatment of hypertension due to side-effects of increased urinary incontinence.     She is on levothyroxine 75 mcg daily for treatment of hypothyroidism. She denies any side-effects to her levothyroxine medication. Notably, her TSH (0.06) was below normal range when checked on 11/15/2024.  Needs lower dose, she is suppose to take only 6 days per week but has been taking everyday again.  Will decrease dose to 6 days per week and then recheck tsh in 6 weeks    She is off ativan, states unable to sleep well with out it.  She has used for 40 years.  She weaned herself off when running out.  She wants to start again for sleep, she states does not need  during the day.  She has not had any for about 3 months per pt but on OARRs she did refill in Sept.    Last vitals taken at cardiology, BP good range    Review of Systems    Objective   There were no vitals taken for this visit.    Physical Exam  Constitutional:       Appearance: Normal appearance.   Pulmonary:      Effort: Pulmonary effort is normal. No respiratory distress.   Neurological:      Mental Status: She is alert.   Psychiatric:         Mood and Affect: Mood normal.         Behavior: Behavior normal.         Thought Content: Thought content normal.         Judgment: Judgment normal.         Assessment/Plan   Problem List Items Addressed This Visit             ICD-10-CM    Anxiety disorder F41.9    Chronic kidney disease, stage 3a (Multi) N18.31    Gastroesophageal reflux disease K21.9    Heart failure with preserved left ventricular function (HFpEF) I50.30    Hypothyroidism - Primary E03.9    Mixed hyperlipidemia E78.2          The patient's labs (11/15/2024) were reviewed. Blood count was mostly WNL, but MCHC (31.7) was slightly below normal range (stable). Chemistries (FBS of 92) and electrolytes were within normal range. Her kidney function markers were mildly abnormal (BUN 19/creatinine 1.09/eGFR 50). Her calcium and protein were within normal range. Her liver enzymes were mostly normal, but AST (51) was slightly elevated. Her magnesium (2.02) was within normal range. Her BNP (180) was elevated. Her TSH (0.06) was below normal range. Her free thyroxine (1.88) was elevated.    In regards to concerns with medication (low TSH), the patient was advised to start taking levothyroxine 75 mcg medication ~6 days per week (rather than daily). She will be re-evaluated based on her next lab results in 6 weeks. For now, she was instructed to continue monitoring symptoms for improvement/exacerbation.    She will follow-up in 6 weeks (repeat thyroid labs done prior to appointment and urine drug screen  will be done  during this appointment appt also, unless otherwise needed.  Discussed Ativan and if refill decrease dose, she has been on 2 mg per day for 40 years.  She is not taking during the day and would like her to continue staying off during the day    6 week follow up planned, she will schedule    Scribe Attestation  By signing my name below, I, Remigio Hightower , Scribe   attest that this documentation has been prepared under the direction and in the presence of Corinne Richards DO.

## 2024-11-26 ENCOUNTER — APPOINTMENT (OUTPATIENT)
Dept: CARDIOLOGY | Facility: CLINIC | Age: 84
End: 2024-11-26
Payer: MEDICARE

## 2024-12-01 DIAGNOSIS — E03.9 HYPOTHYROIDISM, UNSPECIFIED TYPE: ICD-10-CM

## 2024-12-02 RX ORDER — LEVOTHYROXINE SODIUM 75 UG/1
75 TABLET ORAL
Qty: 26 TABLET | Refills: 0 | Status: SHIPPED | OUTPATIENT
Start: 2024-12-02

## 2024-12-02 NOTE — TELEPHONE ENCOUNTER
Follow-up visit: none  Last visit: 11/21/24 telemedicine with rtc in 6 wks  Last labs: 11/15/24 (TSH 0.06, T4 1.88)    Pharmacy requested a 90 day supply.  Levothyroxine 75mcg was reduced to 6 times per week (Monday, Tuesday, Wednesday, Thursday, Friday, and Saturday) due to lab results. She was to follow-up in 6 weeks with labs (~1/1/25). Will send another 30 day supply but patient will need to schedule follow-up and complete labs for further refills.

## 2024-12-04 ENCOUNTER — HOSPITAL ENCOUNTER (OUTPATIENT)
Dept: RESPIRATORY THERAPY | Facility: HOSPITAL | Age: 84
Discharge: HOME | End: 2024-12-04
Payer: MEDICARE

## 2024-12-04 ENCOUNTER — HOSPITAL ENCOUNTER (OUTPATIENT)
Dept: CARDIOLOGY | Facility: HOSPITAL | Age: 84
Discharge: HOME | End: 2024-12-04
Payer: MEDICARE

## 2024-12-04 DIAGNOSIS — R06.00 DYSPNEA, UNSPECIFIED: ICD-10-CM

## 2024-12-04 DIAGNOSIS — R06.09 DOE (DYSPNEA ON EXERTION): ICD-10-CM

## 2024-12-04 LAB
AORTIC VALVE MEAN GRADIENT: 2 MMHG
AORTIC VALVE PEAK VELOCITY: 0.86 M/S
AV PEAK GRADIENT: 3 MMHG
AVA (PEAK VEL): 2.84 CM2
AVA (VTI): 3.15 CM2
EJECTION FRACTION APICAL 4 CHAMBER: 61.6
EJECTION FRACTION: 63 %
LEFT ATRIUM VOLUME AREA LENGTH INDEX BSA: 20.8 ML/M2
LEFT VENTRICLE INTERNAL DIMENSION DIASTOLE: 3.81 CM (ref 3.5–6)
LEFT VENTRICULAR OUTFLOW TRACT DIAMETER: 2.11 CM
MITRAL VALVE E/A RATIO: 1.26
RIGHT VENTRICLE PEAK SYSTOLIC PRESSURE: 26.8 MMHG
TRICUSPID ANNULAR PLANE SYSTOLIC EXCURSION: 1.9 CM

## 2024-12-04 PROCEDURE — 2500000004 HC RX 250 GENERAL PHARMACY W/ HCPCS (ALT 636 FOR OP/ED): Performed by: INTERNAL MEDICINE

## 2024-12-04 PROCEDURE — 93306 TTE W/DOPPLER COMPLETE: CPT | Performed by: INTERNAL MEDICINE

## 2024-12-04 PROCEDURE — C8929 TTE W OR WO FOL WCON,DOPPLER: HCPCS

## 2024-12-04 PROCEDURE — 94010 BREATHING CAPACITY TEST: CPT | Performed by: INTERNAL MEDICINE

## 2024-12-04 PROCEDURE — 94010 BREATHING CAPACITY TEST: CPT

## 2024-12-30 ENCOUNTER — APPOINTMENT (OUTPATIENT)
Dept: PRIMARY CARE | Facility: CLINIC | Age: 84
End: 2024-12-30
Payer: MEDICARE

## 2025-01-10 DIAGNOSIS — E03.9 HYPOTHYROIDISM, UNSPECIFIED TYPE: ICD-10-CM

## 2025-01-10 DIAGNOSIS — M11.20 PSEUDOGOUT: ICD-10-CM

## 2025-01-13 RX ORDER — COLCHICINE 0.6 MG/1
0.6 TABLET ORAL DAILY
Qty: 30 TABLET | Refills: 0 | Status: SHIPPED | OUTPATIENT
Start: 2025-01-13

## 2025-01-13 RX ORDER — LEVOTHYROXINE SODIUM 75 UG/1
75 TABLET ORAL
Qty: 26 TABLET | Refills: 0 | Status: SHIPPED | OUTPATIENT
Start: 2025-01-13

## 2025-01-13 NOTE — TELEPHONE ENCOUNTER
Follow-up visit: 1/31/25 (appt in Dec cancelled due to covid)  Last visit: 11/21/24 telemedicine with rtc in 6 wks  Last labs: 11/15/24 (TSH 0.06, T4 1.88, Cr 1.09, eGFR 50, AST 51, ALT 27)    Called patient's daughter to verify if patient has picked up last refill around 12/22/24 for levothyroxine 75mcg 6 times per week. Daughter denies recent fill and reports patient has not seemed to take colchicine 0.6 mg daily in months as well levothyroxine in the past 2 weeks. Confirmed that patient is now taking levothyroxine 6 times weekly. Will send in month supply of the scripts until follow-up where further refills and labs will be discussed with PCP

## 2025-01-31 ENCOUNTER — APPOINTMENT (OUTPATIENT)
Dept: PRIMARY CARE | Facility: CLINIC | Age: 85
End: 2025-01-31
Payer: MEDICARE

## 2025-02-02 DIAGNOSIS — K21.9 GASTROESOPHAGEAL REFLUX DISEASE WITHOUT ESOPHAGITIS: ICD-10-CM

## 2025-02-02 DIAGNOSIS — I10 PRIMARY HYPERTENSION: ICD-10-CM

## 2025-02-03 RX ORDER — FUROSEMIDE 20 MG/1
40 TABLET ORAL DAILY
Qty: 60 TABLET | Refills: 0 | Status: SHIPPED | OUTPATIENT
Start: 2025-02-03

## 2025-02-03 RX ORDER — PANTOPRAZOLE SODIUM 40 MG/1
40 TABLET, DELAYED RELEASE ORAL DAILY
Qty: 90 TABLET | Refills: 1 | Status: SHIPPED | OUTPATIENT
Start: 2025-02-03

## 2025-02-04 DIAGNOSIS — M11.20 PSEUDOGOUT: ICD-10-CM

## 2025-02-04 RX ORDER — COLCHICINE 0.6 MG/1
0.6 TABLET ORAL DAILY
Qty: 90 TABLET | Refills: 1 | Status: SHIPPED | OUTPATIENT
Start: 2025-02-04

## 2025-02-13 ENCOUNTER — TELEMEDICINE (OUTPATIENT)
Dept: PRIMARY CARE | Facility: CLINIC | Age: 85
End: 2025-02-13
Payer: MEDICARE

## 2025-02-13 DIAGNOSIS — J06.9 UPPER RESPIRATORY TRACT INFECTION, UNSPECIFIED TYPE: ICD-10-CM

## 2025-02-13 DIAGNOSIS — J20.9 ACUTE BRONCHITIS, UNSPECIFIED ORGANISM: ICD-10-CM

## 2025-02-13 DIAGNOSIS — J01.00 ACUTE NON-RECURRENT MAXILLARY SINUSITIS: Primary | ICD-10-CM

## 2025-02-13 RX ORDER — BENZONATATE 200 MG/1
200 CAPSULE ORAL 3 TIMES DAILY PRN
Qty: 30 CAPSULE | Refills: 1 | Status: SHIPPED | OUTPATIENT
Start: 2025-02-13 | End: 2025-03-15

## 2025-02-13 RX ORDER — FLUTICASONE PROPIONATE 50 MCG
1 SPRAY, SUSPENSION (ML) NASAL 2 TIMES DAILY
Qty: 16 G | Refills: 1 | Status: SHIPPED | OUTPATIENT
Start: 2025-02-13 | End: 2026-02-13

## 2025-02-13 ASSESSMENT — ENCOUNTER SYMPTOMS: COUGH: 1

## 2025-02-13 NOTE — PROGRESS NOTES
Subjective   Patient ID: Lisa Hassan is a 84 y.o. female who presents with multiple complaints    Cough    Since February 8, the patient reports a history of a fever maximum temperature 100.7 °F, nonproductive cough, nasal congestion, rhinorrhea, copious postnasal drip, and a decreased appetite.  She reports no other associated symptoms.  She tested negative for influenza A and influenza B today.  Also, she did have COVID-19 in December.    Review of Systems   Respiratory:  Positive for cough.        Objective   There were no vitals taken for this visit.    Physical Exam  General-the patient is alert and oriented x 3 and is in no apparent distress.  She is able to speak comfortably in full sentences.  Lungs-no cough or wheezing noted on forced expiration  Assessment/Plan        Assessment  Low-grade fever, nonproductive cough, nasal congestion, rhinorrhea, copious postnasal drip-May be secondary to viral syndrome, sinusitis-less likely  Anorexia-May be secondary to viral syndrome, sinusitis-unlikely  Plan  Begin Flonase spray 1 spray to each nostril twice daily as needed.  Begin Tessalon Perles 200 mg p.o. 3 times daily as needed cough  I told the patient that she could use Tylenol 1000 mg p.o. every 6 hours as needed.  I told the patient to call if symptoms have not improved in 5 days; not resolved in 10 days    15 minutes spent in patient care

## 2025-02-18 ENCOUNTER — APPOINTMENT (OUTPATIENT)
Dept: PRIMARY CARE | Facility: CLINIC | Age: 85
End: 2025-02-18
Payer: MEDICARE

## 2025-02-22 PROBLEM — J10.1 INFLUENZA A: Status: ACTIVE | Noted: 2025-02-22

## 2025-02-23 NOTE — PROGRESS NOTES
Subjective   Patient ID: Lisa Hassan is a 84 y.o. female who presents for No chief complaint on file..    HPI     Review of Systems    Objective   There were no vitals taken for this visit.    Physical Exam    Assessment/Plan   {Assess/PlanSmartLinks:[id number]}        tremor noted left hand.  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    Relevant Medications    colchicine 0.6 mg tablet    Other Relevant Orders    CBC and Auto Differential    Vitamin B12    Vitamin D 25-Hydroxy,Total (for eval of Vitamin D levels)    Chronic kidney disease, stage 3a (Multi) N18.31    Relevant Orders    CBC and Auto Differential    Vitamin B12    Vitamin D 25-Hydroxy,Total (for eval of Vitamin D levels)    Heart failure with preserved left ventricular function (HFpEF) I50.30    Relevant Orders    CBC and Auto Differential    Vitamin B12    Vitamin D 25-Hydroxy,Total (for eval of Vitamin D levels)    Hypothyroidism E03.9    Relevant Medications    levothyroxine (Synthroid, Levoxyl) 75 mcg tablet    Other Relevant Orders    CBC and Auto Differential    Vitamin B12    Vitamin D 25-Hydroxy,Total (for eval of Vitamin D levels)    Hypertension - Primary I10    Relevant Orders    CBC and Auto Differential    Vitamin B12    Vitamin D 25-Hydroxy,Total (for eval of Vitamin D levels)    Influenza A J10.1    Relevant Orders    CBC and Auto Differential    Vitamin B12    Vitamin D 25-Hydroxy,Total (for eval of Vitamin D levels)     Other Visit Diagnoses         Codes    SOB (shortness of breath) on exertion     R06.02    Relevant Orders    CBC and Auto Differential    Vitamin B12    Vitamin D 25-Hydroxy,Total (for eval of Vitamin D levels)    Type 2 diabetes mellitus with hyperglycemia, unspecified whether long term insulin use (Multi)     E11.65    Relevant Orders    CBC and Auto Differential    Vitamin B12    Vitamin D 25-Hydroxy,Total (for eval of Vitamin D levels)             Assessment  HFpEF  Hypertension  Influenza A February 14, 2025  Continued nonproductive cough, nasal congestion, rhinorrhea, postnasal drip-likely secondary to  influenza A.  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  May be secondary to oversupplementation of thyroid hormone, parkinsonism.  Lumbar spinal stenosis  Plan  Obtain CBC differential, CMP, TSH, hemoglobin A1c, vitamin D level, vitamin B12 level today.  I have urged the patient to use Flonase spray 1 spray to each nostril twice daily as needed and I told her that she could take honey 15 mL p.o. every 4-6 hours as needed cough.  I again urged the patient to begin use of spironolactone at a dose of 12.5 mg daily-prescribed by the patient's cardiologist  I also recommend that the patient restart colchicine but at a dose of 0.6 mg p.o. every other day.  She will continue all of her other current medications for now pending the results of lab work.

## 2025-02-24 RX ORDER — LEVOTHYROXINE SODIUM 75 UG/1
75 TABLET ORAL
Qty: 72 TABLET | Refills: 2 | Status: SHIPPED | OUTPATIENT
Start: 2025-02-24

## 2025-02-25 ENCOUNTER — APPOINTMENT (OUTPATIENT)
Dept: PRIMARY CARE | Facility: CLINIC | Age: 85
End: 2025-02-25
Payer: MEDICARE

## 2025-02-25 VITALS
SYSTOLIC BLOOD PRESSURE: 114 MMHG | DIASTOLIC BLOOD PRESSURE: 60 MMHG | BODY MASS INDEX: 21.79 KG/M2 | TEMPERATURE: 97.1 F | HEIGHT: 60 IN | HEART RATE: 68 BPM | WEIGHT: 111 LBS

## 2025-02-25 DIAGNOSIS — I10 PRIMARY HYPERTENSION: Primary | ICD-10-CM

## 2025-02-25 DIAGNOSIS — M25.569 PAIN IN UNSPECIFIED KNEE: ICD-10-CM

## 2025-02-25 DIAGNOSIS — J10.1 INFLUENZA A: ICD-10-CM

## 2025-02-25 DIAGNOSIS — R06.02 SOB (SHORTNESS OF BREATH) ON EXERTION: ICD-10-CM

## 2025-02-25 DIAGNOSIS — N18.31 CHRONIC KIDNEY DISEASE, STAGE 3A (MULTI): ICD-10-CM

## 2025-02-25 DIAGNOSIS — E11.65 TYPE 2 DIABETES MELLITUS WITH HYPERGLYCEMIA, UNSPECIFIED WHETHER LONG TERM INSULIN USE (MULTI): ICD-10-CM

## 2025-02-25 DIAGNOSIS — M35.3: ICD-10-CM

## 2025-02-25 DIAGNOSIS — E03.9 HYPOTHYROIDISM, UNSPECIFIED TYPE: ICD-10-CM

## 2025-02-25 DIAGNOSIS — I50.30 HEART FAILURE WITH PRESERVED LEFT VENTRICULAR FUNCTION (HFPEF): ICD-10-CM

## 2025-02-25 PROCEDURE — 1123F ACP DISCUSS/DSCN MKR DOCD: CPT | Performed by: INTERNAL MEDICINE

## 2025-02-25 PROCEDURE — 1159F MED LIST DOCD IN RCRD: CPT | Performed by: INTERNAL MEDICINE

## 2025-02-25 PROCEDURE — 99214 OFFICE O/P EST MOD 30 MIN: CPT | Performed by: INTERNAL MEDICINE

## 2025-02-25 PROCEDURE — 3074F SYST BP LT 130 MM HG: CPT | Performed by: INTERNAL MEDICINE

## 2025-02-25 PROCEDURE — 1160F RVW MEDS BY RX/DR IN RCRD: CPT | Performed by: INTERNAL MEDICINE

## 2025-02-25 PROCEDURE — 3078F DIAST BP <80 MM HG: CPT | Performed by: INTERNAL MEDICINE

## 2025-02-25 RX ORDER — DICLOFENAC SODIUM 10 MG/G
4 GEL TOPICAL DAILY
Qty: 200 G | Refills: 2 | Status: SHIPPED | OUTPATIENT
Start: 2025-02-25

## 2025-02-25 RX ORDER — COLCHICINE 0.6 MG/1
0.6 TABLET ORAL EVERY OTHER DAY
Qty: 15 TABLET | Refills: 11 | Status: SHIPPED | OUTPATIENT
Start: 2025-02-25 | End: 2026-02-25

## 2025-03-08 LAB
25(OH)D3+25(OH)D2 SERPL-MCNC: 27 NG/ML (ref 30–100)
ALBUMIN SERPL-MCNC: 4.4 G/DL (ref 3.6–5.1)
ALP SERPL-CCNC: 87 U/L (ref 37–153)
ALT SERPL-CCNC: 15 U/L (ref 6–29)
ANION GAP SERPL CALCULATED.4IONS-SCNC: 12 MMOL/L (CALC) (ref 7–17)
AST SERPL-CCNC: 35 U/L (ref 10–35)
BASOPHILS # BLD AUTO: 24 CELLS/UL (ref 0–200)
BASOPHILS NFR BLD AUTO: 0.3 %
BILIRUB SERPL-MCNC: 0.6 MG/DL (ref 0.2–1.2)
BUN SERPL-MCNC: 29 MG/DL (ref 7–25)
CALCIUM SERPL-MCNC: 9.6 MG/DL (ref 8.6–10.4)
CHLORIDE SERPL-SCNC: 107 MMOL/L (ref 98–110)
CO2 SERPL-SCNC: 23 MMOL/L (ref 20–32)
CREAT SERPL-MCNC: 1.26 MG/DL (ref 0.6–0.95)
EGFRCR SERPLBLD CKD-EPI 2021: 42 ML/MIN/1.73M2
EOSINOPHIL # BLD AUTO: 158 CELLS/UL (ref 15–500)
EOSINOPHIL NFR BLD AUTO: 2 %
ERYTHROCYTE [DISTWIDTH] IN BLOOD BY AUTOMATED COUNT: 14.2 % (ref 11–15)
EST. AVERAGE GLUCOSE BLD GHB EST-MCNC: 105 MG/DL
EST. AVERAGE GLUCOSE BLD GHB EST-SCNC: 5.8 MMOL/L
GLUCOSE SERPL-MCNC: 89 MG/DL (ref 65–139)
HBA1C MFR BLD: 5.3 % OF TOTAL HGB
HCT VFR BLD AUTO: 41.1 % (ref 35–45)
HGB BLD-MCNC: 13.4 G/DL (ref 11.7–15.5)
LYMPHOCYTES # BLD AUTO: 3065 CELLS/UL (ref 850–3900)
LYMPHOCYTES NFR BLD AUTO: 38.8 %
MCH RBC QN AUTO: 29.5 PG (ref 27–33)
MCHC RBC AUTO-ENTMCNC: 32.6 G/DL (ref 32–36)
MCV RBC AUTO: 90.5 FL (ref 80–100)
MONOCYTES # BLD AUTO: 411 CELLS/UL (ref 200–950)
MONOCYTES NFR BLD AUTO: 5.2 %
NEUTROPHILS # BLD AUTO: 4242 CELLS/UL (ref 1500–7800)
NEUTROPHILS NFR BLD AUTO: 53.7 %
PLATELET # BLD AUTO: 214 THOUSAND/UL (ref 140–400)
PMV BLD REES-ECKER: 10.9 FL (ref 7.5–12.5)
POTASSIUM SERPL-SCNC: 4.8 MMOL/L (ref 3.5–5.3)
PROT SERPL-MCNC: 6.8 G/DL (ref 6.1–8.1)
RBC # BLD AUTO: 4.54 MILLION/UL (ref 3.8–5.1)
SODIUM SERPL-SCNC: 142 MMOL/L (ref 135–146)
TSH SERPL-ACNC: 0.05 MIU/L (ref 0.4–4.5)
VIT B12 SERPL-MCNC: 505 PG/ML (ref 200–1100)
WBC # BLD AUTO: 7.9 THOUSAND/UL (ref 3.8–10.8)

## 2025-03-09 DIAGNOSIS — I10 PRIMARY HYPERTENSION: ICD-10-CM

## 2025-03-10 RX ORDER — FUROSEMIDE 20 MG/1
40 TABLET ORAL DAILY
Qty: 60 TABLET | Refills: 3 | Status: SHIPPED | OUTPATIENT
Start: 2025-03-10

## 2025-05-07 ENCOUNTER — TELEPHONE (OUTPATIENT)
Dept: CARDIOLOGY | Facility: HOSPITAL | Age: 85
End: 2025-05-07

## 2025-05-07 DIAGNOSIS — F51.01 PRIMARY INSOMNIA: ICD-10-CM

## 2025-05-09 ENCOUNTER — DOCUMENTATION (OUTPATIENT)
Dept: PRIMARY CARE | Facility: CLINIC | Age: 85
End: 2025-05-09
Payer: MEDICARE

## 2025-05-09 DIAGNOSIS — F51.01 PRIMARY INSOMNIA: ICD-10-CM

## 2025-05-09 RX ORDER — TRAZODONE HYDROCHLORIDE 50 MG/1
25 TABLET ORAL NIGHTLY PRN
Qty: 30 TABLET | Refills: 1 | Status: SHIPPED | OUTPATIENT
Start: 2025-05-09 | End: 2025-05-09 | Stop reason: SDUPTHER

## 2025-05-09 RX ORDER — TRAZODONE HYDROCHLORIDE 50 MG/1
25 TABLET ORAL NIGHTLY PRN
Qty: 30 TABLET | Refills: 1 | Status: SHIPPED | OUTPATIENT
Start: 2025-05-09 | End: 2025-05-09 | Stop reason: DRUGHIGH

## 2025-05-09 RX ORDER — TRAZODONE HYDROCHLORIDE 50 MG/1
25 TABLET ORAL NIGHTLY PRN
Qty: 30 TABLET | Refills: 1 | Status: SHIPPED | OUTPATIENT
Start: 2025-05-09 | End: 2026-05-09

## 2025-05-09 RX ORDER — TRAZODONE HYDROCHLORIDE 50 MG/1
25 TABLET ORAL NIGHTLY PRN
Qty: 30 TABLET | Refills: 1 | Status: SHIPPED | OUTPATIENT
Start: 2025-05-09 | End: 2025-05-09 | Stop reason: WASHOUT

## 2025-05-09 NOTE — PROGRESS NOTES
Patient and her daughter report insomnia manifested as difficulty falling asleep.  I have prescribed trazodone to be used at a dose of 25 mg p.o. nightly.  The patient will schedule a live or virtual visit within the next 7 days

## 2025-05-09 NOTE — PROGRESS NOTES
Patient has had a history of difficulty falling asleep, so I have prescribed trazodone to be used at a dose of 25 mg p.o. nightly.  She will schedule a virtual visit within the next 7 days

## 2025-05-12 ENCOUNTER — APPOINTMENT (OUTPATIENT)
Dept: PRIMARY CARE | Facility: CLINIC | Age: 85
End: 2025-05-12
Payer: MEDICARE

## 2025-05-12 DIAGNOSIS — F51.01 PRIMARY INSOMNIA: Primary | ICD-10-CM

## 2025-06-20 DIAGNOSIS — I10 PRIMARY HYPERTENSION: ICD-10-CM

## 2025-06-20 RX ORDER — VERAPAMIL HYDROCHLORIDE 120 MG/1
120 CAPSULE, EXTENDED RELEASE ORAL DAILY
Qty: 30 CAPSULE | Refills: 6 | Status: SHIPPED | OUTPATIENT
Start: 2025-06-20 | End: 2025-07-20

## 2025-07-01 ENCOUNTER — TELEPHONE (OUTPATIENT)
Dept: CARDIOLOGY | Facility: HOSPITAL | Age: 85
End: 2025-07-01
Payer: MEDICARE

## 2025-07-01 DIAGNOSIS — I10 PRIMARY HYPERTENSION: ICD-10-CM

## 2025-07-01 RX ORDER — METOPROLOL SUCCINATE 25 MG/1
25 TABLET, EXTENDED RELEASE ORAL DAILY
Qty: 30 TABLET | Refills: 0 | Status: SHIPPED | OUTPATIENT
Start: 2025-07-01 | End: 2025-07-31

## 2025-07-31 DIAGNOSIS — E78.00 HYPERCHOLESTEROLEMIA: ICD-10-CM

## 2025-08-04 RX ORDER — ROSUVASTATIN CALCIUM 10 MG/1
10 TABLET, COATED ORAL DAILY
Qty: 90 TABLET | Refills: 3 | Status: SHIPPED | OUTPATIENT
Start: 2025-08-04

## 2025-09-02 DIAGNOSIS — I50.30 HEART FAILURE WITH PRESERVED LEFT VENTRICULAR FUNCTION (HFPEF): Primary | ICD-10-CM

## 2025-09-02 DIAGNOSIS — K21.9 GASTROESOPHAGEAL REFLUX DISEASE WITHOUT ESOPHAGITIS: ICD-10-CM

## 2025-09-02 RX ORDER — PANTOPRAZOLE SODIUM 40 MG/1
40 TABLET, DELAYED RELEASE ORAL DAILY
Qty: 90 TABLET | Refills: 1 | Status: SHIPPED | OUTPATIENT
Start: 2025-09-02

## 2025-09-02 RX ORDER — SPIRONOLACTONE 25 MG/1
25 TABLET ORAL DAILY
Qty: 30 TABLET | Refills: 5 | Status: SHIPPED | OUTPATIENT
Start: 2025-09-02 | End: 2026-03-01

## 2025-09-05 DIAGNOSIS — E03.9 HYPOTHYROIDISM, UNSPECIFIED TYPE: ICD-10-CM

## 2025-09-05 RX ORDER — LEVOTHYROXINE SODIUM 75 UG/1
75 TABLET ORAL
Qty: 72 TABLET | Refills: 2 | Status: SHIPPED | OUTPATIENT
Start: 2025-09-05